# Patient Record
Sex: MALE | Race: WHITE | Employment: OTHER | URBAN - METROPOLITAN AREA
[De-identification: names, ages, dates, MRNs, and addresses within clinical notes are randomized per-mention and may not be internally consistent; named-entity substitution may affect disease eponyms.]

---

## 2018-05-22 ENCOUNTER — TRANSFERRED RECORDS (OUTPATIENT)
Dept: HEALTH INFORMATION MANAGEMENT | Facility: CLINIC | Age: 73
End: 2018-05-22

## 2018-05-23 ENCOUNTER — TRANSFERRED RECORDS (OUTPATIENT)
Dept: HEALTH INFORMATION MANAGEMENT | Facility: CLINIC | Age: 73
End: 2018-05-23

## 2019-01-30 ENCOUNTER — TRANSFERRED RECORDS (OUTPATIENT)
Dept: HEALTH INFORMATION MANAGEMENT | Facility: CLINIC | Age: 74
End: 2019-01-30

## 2019-02-04 ENCOUNTER — APPOINTMENT (OUTPATIENT)
Dept: GENERAL RADIOLOGY | Facility: CLINIC | Age: 74
End: 2019-02-04
Payer: MEDICARE

## 2019-02-04 ENCOUNTER — HOSPITAL ENCOUNTER (EMERGENCY)
Facility: CLINIC | Age: 74
Discharge: HOME | End: 2019-02-04
Attending: NURSE PRACTITIONER | Admitting: NURSE PRACTITIONER
Payer: MEDICARE

## 2019-02-04 ENCOUNTER — MEDICAL CORRESPONDENCE (OUTPATIENT)
Dept: HEALTH INFORMATION MANAGEMENT | Facility: CLINIC | Age: 74
End: 2019-02-04

## 2019-02-04 ENCOUNTER — APPOINTMENT (OUTPATIENT)
Dept: CT IMAGING | Facility: CLINIC | Age: 74
End: 2019-02-04
Payer: MEDICARE

## 2019-02-04 VITALS
RESPIRATION RATE: 16 BRPM | HEIGHT: 71 IN | DIASTOLIC BLOOD PRESSURE: 71 MMHG | OXYGEN SATURATION: 99 % | TEMPERATURE: 98.1 F | WEIGHT: 188 LBS | SYSTOLIC BLOOD PRESSURE: 129 MMHG | HEART RATE: 66 BPM | BODY MASS INDEX: 26.32 KG/M2

## 2019-02-04 DIAGNOSIS — Z51.81 ENCOUNTER FOR THERAPEUTIC DRUG MONITORING: ICD-10-CM

## 2019-02-04 DIAGNOSIS — E78.5 DYSLIPIDEMIA: ICD-10-CM

## 2019-02-04 DIAGNOSIS — E27.40 HYPOADRENALISM (H): ICD-10-CM

## 2019-02-04 DIAGNOSIS — R79.0 LOW FERRITIN: ICD-10-CM

## 2019-02-04 DIAGNOSIS — R79.89 LOW TESTOSTERONE: Primary | ICD-10-CM

## 2019-02-04 DIAGNOSIS — R55 SYNCOPE: ICD-10-CM

## 2019-02-04 LAB
ANION GAP SERPL CALCULATED.3IONS-SCNC: 5 MMOL/L (ref 3–14)
BASOPHILS # BLD AUTO: 0 10E9/L (ref 0–0.2)
BASOPHILS NFR BLD AUTO: 0.4 %
BUN SERPL-MCNC: 17 MG/DL (ref 7–30)
CALCIUM SERPL-MCNC: 9.1 MG/DL (ref 8.5–10.1)
CHLORIDE SERPL-SCNC: 107 MMOL/L (ref 94–109)
CO2 SERPL-SCNC: 28 MMOL/L (ref 20–32)
CREAT SERPL-MCNC: 0.89 MG/DL (ref 0.66–1.25)
D DIMER PPP FEU-MCNC: <0.3 UG/ML FEU (ref 0–0.5)
DIFFERENTIAL METHOD BLD: NORMAL
EOSINOPHIL # BLD AUTO: 0.2 10E9/L (ref 0–0.7)
EOSINOPHIL NFR BLD AUTO: 3.7 %
ERYTHROCYTE [DISTWIDTH] IN BLOOD BY AUTOMATED COUNT: 13.7 % (ref 10–15)
GFR SERPL CREATININE-BSD FRML MDRD: 83 ML/MIN/{1.73_M2}
GLUCOSE SERPL-MCNC: 86 MG/DL (ref 70–99)
HCT VFR BLD AUTO: 42.5 % (ref 40–53)
HGB BLD-MCNC: 14.5 G/DL (ref 13.3–17.7)
IMM GRANULOCYTES # BLD: 0 10E9/L (ref 0–0.4)
IMM GRANULOCYTES NFR BLD: 0.2 %
INTERPRETATION ECG - MUSE: NORMAL
LYMPHOCYTES # BLD AUTO: 1.8 10E9/L (ref 0.8–5.3)
LYMPHOCYTES NFR BLD AUTO: 33.6 %
MCH RBC QN AUTO: 32 PG (ref 26.5–33)
MCHC RBC AUTO-ENTMCNC: 34.1 G/DL (ref 31.5–36.5)
MCV RBC AUTO: 94 FL (ref 78–100)
MONOCYTES # BLD AUTO: 0.3 10E9/L (ref 0–1.3)
MONOCYTES NFR BLD AUTO: 5.3 %
NEUTROPHILS # BLD AUTO: 3.1 10E9/L (ref 1.6–8.3)
NEUTROPHILS NFR BLD AUTO: 56.8 %
NRBC # BLD AUTO: 0 10*3/UL
NRBC BLD AUTO-RTO: 0 /100
PLATELET # BLD AUTO: 210 10E9/L (ref 150–450)
POTASSIUM SERPL-SCNC: 4.1 MMOL/L (ref 3.4–5.3)
RBC # BLD AUTO: 4.53 10E12/L (ref 4.4–5.9)
SODIUM SERPL-SCNC: 140 MMOL/L (ref 133–144)
TROPONIN I SERPL-MCNC: <0.015 UG/L (ref 0–0.04)
WBC # BLD AUTO: 5.4 10E9/L (ref 4–11)

## 2019-02-04 PROCEDURE — 85025 COMPLETE CBC W/AUTO DIFF WBC: CPT | Performed by: NURSE PRACTITIONER

## 2019-02-04 PROCEDURE — 99285 EMERGENCY DEPT VISIT HI MDM: CPT | Mod: 25

## 2019-02-04 PROCEDURE — 71046 X-RAY EXAM CHEST 2 VIEWS: CPT

## 2019-02-04 PROCEDURE — 80048 BASIC METABOLIC PNL TOTAL CA: CPT | Performed by: NURSE PRACTITIONER

## 2019-02-04 PROCEDURE — 85379 FIBRIN DEGRADATION QUANT: CPT | Performed by: NURSE PRACTITIONER

## 2019-02-04 PROCEDURE — 84484 ASSAY OF TROPONIN QUANT: CPT | Performed by: NURSE PRACTITIONER

## 2019-02-04 PROCEDURE — 70450 CT HEAD/BRAIN W/O DYE: CPT

## 2019-02-04 RX ORDER — TESTOSTERONE ENANTHATE 200 MG/ML
INJECTION, SOLUTION INTRAMUSCULAR
COMMUNITY
End: 2020-12-09

## 2019-02-04 SDOH — HEALTH STABILITY: MENTAL HEALTH: HOW OFTEN DO YOU HAVE A DRINK CONTAINING ALCOHOL?: NEVER

## 2019-02-04 ASSESSMENT — ENCOUNTER SYMPTOMS
CONSTIPATION: 0
WEAKNESS: 0
FATIGUE: 1
DIARRHEA: 0
DYSURIA: 0
ABDOMINAL PAIN: 0

## 2019-02-04 ASSESSMENT — MIFFLIN-ST. JEOR: SCORE: 1609.89

## 2019-02-04 NOTE — ED AVS SNAPSHOT
Emergency Department  [address] [place] [address]  Phone:  [phone]  Fax:  [phone]                                    Supa Garcia   MRN: [id number]    Department:   Emergency Department   Date of Visit:  2/4/2019           After Visit Summary Signature Page    I have received my discharge instructions, and my questions have been answered. I have discussed any challenges I see with this plan with the nurse or doctor.    ..........................................................................................................................................  Patient/Patient Representative Signature      ..........................................................................................................................................  Patient Representative Print Name and Relationship to Patient    ..................................................               ................................................  Date                                   Time    ..........................................................................................................................................  Reviewed by Signature/Title    ...................................................              ..............................................  Date                                               Time          22EPIC Rev 08/18

## 2019-02-04 NOTE — ED PROVIDER NOTES
"  History     Chief Complaint:  Syncope    HPI   Supa Garcia is a 73 year old male who presents alone to the Emergency Department for evaluation of syncope. The patient reports that on 01/30/2019 he was seen in the E.D in [place] after collapsing and having 10 minute syncopal episode immediately after ejaculation during coitus. He notes that prior to this episode, he was asymptomatic.  His HR was low and his wife performed CPR for 30 seconds before he woke and called 911.Since this episode, he has not had any syncope episode, chest pain, shortness or breath, dizziness, headache. However, he wants to rule out any underlying abnormalities causing his syncopal episode. He has had prior cardiac workup 4 years ago, given his family history. He denies any dysuria, diarrhea, abdominal pain, or extremity weakness or numbness.      Of note, the patient has been on testosterone enanthate injections for the past 3 months. His injection are once weekly. And he notes improvement in his libido.       Allergies:  No Known Drug Allergies     Medications:    testosterone enanthate     Past Medical History:    Allergic state  Decreased Libido   Hearing loss     Past Surgical History:    HERNIA REPAIR    Family History:   Cardiac-brother passed away at 61 from MI     Social History:  Marital Status:     The patient presents alone  Smoking Status: Never  Smokeless Tobacco: Never  Alcohol Use: Yes-Social      Review of Systems   Constitutional: Positive for fatigue.   Eyes: Positive for visual disturbance.   Gastrointestinal: Negative for abdominal pain, constipation and diarrhea.   Genitourinary: Negative for dysuria.   Neurological: Positive for syncope. Negative for weakness.   All other systems reviewed and are negative.    Physical Exam   First Vitals:  BP: 125/74  Pulse: 63  Temp: 98  F (36.7  C)  Resp: 18  Height: 180.3 cm (5' 11\")  SpO2: 99 %    Physical Exam  Nursing notes reviewed. Vitals " reviewed.  General: Alert. Well kept.  Eyes:  Conjunctiva non-injected, non-icteric. EOMI  Neck/Throat: Moist mucous membranes. Normal voice.  Cardiac: Regular rhythm. Normal heart sounds.  Pulmonary: Clear and equal breath sounds bilaterally.   Abdomen: soft and non-tender.  Musculoskeletal: Normal gross range of motion of all 4 extremities.   Skin: Warm and dry. Normal appearance of visualized exposed skin without rashes or petechiae.  Psych: Affect normal. Good eye contact.  Neurological:  Mental: Alert and oriented x4. follows commands, no aphasia or dysarthria.   Cranial Nerves:  CN II: visual acuity - able to accurately count fingers with each eye. Visual fields intact.  CN III, IV, VI: EOM intact, pupils equal and reactive  CN V: facial sensation nl  CN VII: face symmetric, no facial droop  CN VIII: hearing normal  CN IX: palate elevation symmetric, uvula at midline  CN XI SCM normal, shoulder shrug nl  CN XII: tongue midline  Motor: Strength: 5/5 in all major groups of all extremities. Normal tone. No abnormal movements. No pronator drift b/l.  Reflexes:  No clonus noted.    Sensory: temperature, light touch intact.   Coordination: FNF and heel-shin tests intact b/l.   Gait:  Normal.    Emergency Department Course     Imaging:  Radiology findings were communicated with the patient who voiced understanding of the findings.    XR Chest 2 Views  No radiographic evidence of acute chest abnormality.   Report per radiology      Head CT w/o contrast  No evidence of acute intracranial hemorrhage, mass, or  Herniation.  Report per radiology      Laboratory:  Laboratory findings were communicated with the patient who voiced understanding of the findings.    Troponin: < 0.015     D Dimer: < 0.3    BMP: AWNL (Creatinine 0.89)     CBC: AWNL. (WBC 5.4, HGB 14.5, )      Emergency Department Course:  Nursing notes and vitals reviewed.  1600: I performed an exam of the patient as documented above.     Findings and  plan explained to the Patient. Patient discharged home with instructions regarding supportive care, medications, and reasons to return. The importance of close follow-up was reviewed.     Impression & Plan      Medical Decision Making:  Supa Garcia is a 73 year old male who presents for recheck on syncope. Patient was initially seen 5 days ago after sustaining an episode of syncope which required less than 1 min CPR per his wife's report. He was evaluated in the ED at [place], [place], and thought likely to have vaso-vagal syncope following ejaculation during coitus. The patient states that since that time he has had no symptoms including no chest pain, shortness of breath, headache, dizziness, weakness/numbness, or fevers. He has had no repeat syncopal event and he has been able to maintain his normal activities. He was instructed to return for follow with his primary care provider, but patient elected to follow up in the ED. Initial work up included a CBC, CMP, troponin which all returned without abnormality. Patient and work up today is without abnormality, and troponin returns negative. PE is considered  Unlikely and d dimer returns negative. CBC and BMP are without abnormality, is tolerating fluids, and has no signs of infectious process. Chest x-ray shows no signs of fluid overload, pneumonia, pneumothorax. Secondary to concern for possible stroke as cause of his symptoms, head CT was obtained and shows no evidence of acute intracranial hemorrhage, mass, or herniation. His neurologic exam is normal and stroke is considered unlikely. I discussed with the patient that stress echo would be indicated and patient requested it be ordered from the ED which was completed today. He does not have a cardiologist as he has had no cardiac evaluation in the past. He will follow up with primary care within 72 hours and was given a referral. No indication for further work up or admission. Patient was discharged in  good condition.     Diagnosis:    ICD-10-CM    1. Syncope R55 Exercise Stress Echocardiogram     Disposition:  discharged to home  Kimberly Rosenberg  2/4/2019    EMERGENCY DEPARTMENT    Scribe Disclosure:  I, Kimberly Rosenberg, am serving as a scribe at 4:00 PM on 2/4/2019 to document services personally performed by Bridget Mitchell CNP based on my observations and the provider's statements to me.        Bridget Mitchell CNP  02/04/19 2011

## 2019-02-05 ENCOUNTER — HOSPITAL ENCOUNTER (OUTPATIENT)
Dept: CARDIOLOGY | Facility: CLINIC | Age: 74
Discharge: HOME | End: 2019-02-05
Attending: NURSE PRACTITIONER | Admitting: NURSE PRACTITIONER
Payer: MEDICARE

## 2019-02-05 ENCOUNTER — HOSPITAL ENCOUNTER (OUTPATIENT)
Dept: CARDIOLOGY | Facility: CLINIC | Age: 74
End: 2019-02-05
Payer: MEDICARE

## 2019-02-05 ENCOUNTER — HOSPITAL ENCOUNTER (OUTPATIENT)
Dept: LAB | Facility: CLINIC | Age: 74
End: 2019-02-05
Attending: NURSE PRACTITIONER
Payer: MEDICARE

## 2019-02-05 DIAGNOSIS — R79.89 LOW TESTOSTERONE: ICD-10-CM

## 2019-02-05 DIAGNOSIS — E27.40 HYPOADRENALISM (H): ICD-10-CM

## 2019-02-05 DIAGNOSIS — Z82.49 FAMILY HISTORY OF CORONARY ARTERY DISEASE: ICD-10-CM

## 2019-02-05 DIAGNOSIS — R55 SYNCOPE: ICD-10-CM

## 2019-02-05 DIAGNOSIS — Z51.81 ENCOUNTER FOR THERAPEUTIC DRUG MONITORING: ICD-10-CM

## 2019-02-05 DIAGNOSIS — E78.5 DYSLIPIDEMIA: ICD-10-CM

## 2019-02-05 DIAGNOSIS — R79.0 LOW FERRITIN: ICD-10-CM

## 2019-02-05 LAB
CHOLEST SERPL-MCNC: 230 MG/DL
CORTIS SERPL-MCNC: 8.8 UG/DL (ref 4–22)
ERYTHROCYTE [DISTWIDTH] IN BLOOD BY AUTOMATED COUNT: 13.7 % (ref 10–15)
FERRITIN SERPL-MCNC: 50 NG/ML (ref 26–388)
HCT VFR BLD AUTO: 41.7 % (ref 40–53)
HDLC SERPL-MCNC: 57 MG/DL
HGB BLD-MCNC: 14.5 G/DL (ref 13.3–17.7)
LDLC SERPL CALC-MCNC: 145 MG/DL
MCH RBC QN AUTO: 32.4 PG (ref 26.5–33)
MCHC RBC AUTO-ENTMCNC: 34.8 G/DL (ref 31.5–36.5)
MCV RBC AUTO: 93 FL (ref 78–100)
NONHDLC SERPL-MCNC: 173 MG/DL
PLATELET # BLD AUTO: 184 10E9/L (ref 150–450)
RBC # BLD AUTO: 4.47 10E12/L (ref 4.4–5.9)
TRIGL SERPL-MCNC: 140 MG/DL
WBC # BLD AUTO: 5.7 10E9/L (ref 4–11)

## 2019-02-05 PROCEDURE — 82533 TOTAL CORTISOL: CPT | Performed by: FAMILY MEDICINE

## 2019-02-05 PROCEDURE — 80061 LIPID PANEL: CPT | Performed by: FAMILY MEDICINE

## 2019-02-05 PROCEDURE — 40000264 ECHO STRESS ECHOCARDIOGRAM

## 2019-02-05 PROCEDURE — 84270 ASSAY OF SEX HORMONE GLOBUL: CPT | Performed by: FAMILY MEDICINE

## 2019-02-05 PROCEDURE — 85027 COMPLETE CBC AUTOMATED: CPT | Performed by: FAMILY MEDICINE

## 2019-02-05 PROCEDURE — 93321 DOPPLER ECHO F-UP/LMTD STD: CPT | Mod: 26 | Performed by: INTERNAL MEDICINE

## 2019-02-05 PROCEDURE — 82728 ASSAY OF FERRITIN: CPT | Performed by: FAMILY MEDICINE

## 2019-02-05 PROCEDURE — 75571 CT HRT W/O DYE W/CA TEST: CPT

## 2019-02-05 PROCEDURE — 82627 DEHYDROEPIANDROSTERONE: CPT | Performed by: FAMILY MEDICINE

## 2019-02-05 PROCEDURE — 25500064 ZZH RX 255 OP 636: Performed by: NURSE PRACTITIONER

## 2019-02-05 PROCEDURE — 36415 COLL VENOUS BLD VENIPUNCTURE: CPT | Performed by: FAMILY MEDICINE

## 2019-02-05 PROCEDURE — 93016 CV STRESS TEST SUPVJ ONLY: CPT | Performed by: INTERNAL MEDICINE

## 2019-02-05 PROCEDURE — 82670 ASSAY OF TOTAL ESTRADIOL: CPT | Performed by: FAMILY MEDICINE

## 2019-02-05 PROCEDURE — 84403 ASSAY OF TOTAL TESTOSTERONE: CPT | Performed by: FAMILY MEDICINE

## 2019-02-05 PROCEDURE — 93350 STRESS TTE ONLY: CPT | Mod: 26 | Performed by: INTERNAL MEDICINE

## 2019-02-05 PROCEDURE — 93325 DOPPLER ECHO COLOR FLOW MAPG: CPT | Mod: 26 | Performed by: INTERNAL MEDICINE

## 2019-02-05 PROCEDURE — 93018 CV STRESS TEST I&R ONLY: CPT | Performed by: INTERNAL MEDICINE

## 2019-02-05 PROCEDURE — 75571 CT HRT W/O DYE W/CA TEST: CPT | Mod: 26 | Performed by: INTERNAL MEDICINE

## 2019-02-05 RX ADMIN — HUMAN ALBUMIN MICROSPHERES AND PERFLUTREN 3 ML: 10; .22 INJECTION, SOLUTION INTRAVENOUS at 13:45

## 2019-02-06 LAB — DHEA-S SERPL-MCNC: 93 UG/DL (ref 80–560)

## 2019-02-07 LAB
RADIOLOGIST FLAGS: ABNORMAL
SHBG SERPL-SCNC: 39 NMOL/L (ref 11–80)
TESTOST FREE SERPL-MCNC: 3.87 NG/DL (ref 4.7–24.4)
TESTOST SERPL-MCNC: 224 NG/DL (ref 240–950)

## 2019-02-07 NOTE — PROGRESS NOTES
RN attempted to call patient twice, no answer, unable to leave message. Copy of calcium screen results will be mailed to pt and PCP.

## 2019-02-08 NOTE — PROGRESS NOTES
Calcium score results sent to patient and routed to PCP list in Epic, Rakesh Copeland          .Amanda Arciniega, CV Imaging Manager

## 2019-02-12 LAB — ESTRADIOL SERPL HS-MCNC: 13 PG/ML (ref 10–40)

## 2019-02-13 ENCOUNTER — CARE COORDINATION (OUTPATIENT)
Dept: CARDIOLOGY | Facility: CLINIC | Age: 74
End: 2019-02-13

## 2019-02-14 ENCOUNTER — TRANSFERRED RECORDS (OUTPATIENT)
Dept: HEALTH INFORMATION MANAGEMENT | Facility: CLINIC | Age: 74
End: 2019-02-14

## 2019-02-14 PROBLEM — R55 SYNCOPE: Status: ACTIVE | Noted: 2019-02-14

## 2019-02-21 ENCOUNTER — OFFICE VISIT (OUTPATIENT)
Dept: CARDIOLOGY | Facility: CLINIC | Age: 74
End: 2019-02-21
Payer: MEDICARE

## 2019-02-21 VITALS
WEIGHT: 195 LBS | SYSTOLIC BLOOD PRESSURE: 130 MMHG | HEIGHT: 71 IN | BODY MASS INDEX: 27.3 KG/M2 | HEART RATE: 72 BPM | DIASTOLIC BLOOD PRESSURE: 76 MMHG

## 2019-02-21 DIAGNOSIS — E78.5 HYPERLIPIDEMIA WITH TARGET LDL LESS THAN 130: ICD-10-CM

## 2019-02-21 DIAGNOSIS — R55 VASOVAGAL SYNCOPE: ICD-10-CM

## 2019-02-21 DIAGNOSIS — Z82.49 FAMILY HISTORY OF CORONARY ARTERY DISEASE: Primary | ICD-10-CM

## 2019-02-21 PROCEDURE — 99204 OFFICE O/P NEW MOD 45 MIN: CPT | Performed by: INTERNAL MEDICINE

## 2019-02-21 RX ORDER — CHLORAL HYDRATE 500 MG
1 CAPSULE ORAL DAILY
COMMUNITY

## 2019-02-21 RX ORDER — ATORVASTATIN CALCIUM 20 MG/1
20 TABLET, FILM COATED ORAL DAILY
Qty: 90 TABLET | Refills: 3 | Status: SHIPPED | OUTPATIENT
Start: 2019-02-21 | End: 2019-05-22

## 2019-02-21 RX ORDER — VITS A,C,E/LUTEIN/MINERALS 300MCG-200
1 TABLET ORAL DAILY
COMMUNITY

## 2019-02-21 ASSESSMENT — MIFFLIN-ST. JEOR: SCORE: 1633.7

## 2019-02-21 NOTE — PROGRESS NOTES
HPI and Plan:   See dictation    No orders of the defined types were placed in this encounter.    Orders Placed This Encounter   Medications     multivitamin (OCUVITE) TABS tablet     Sig: Take 1 tablet by mouth daily     fish oil-omega-3 fatty acids 1000 MG capsule     Sig: Take 1 g by mouth daily     There are no discontinued medications.      No diagnosis found.    CURRENT MEDICATIONS:  Current Outpatient Medications   Medication Sig Dispense Refill     fish oil-omega-3 fatty acids 1000 MG capsule Take 1 g by mouth daily       multivitamin (OCUVITE) TABS tablet Take 1 tablet by mouth daily       testosterone enanthate (DELATESTRYL) 200 MG/ML injection Inject into the muscle every 14 days         ALLERGIES   No Known Allergies    PAST MEDICAL HISTORY:  Past Medical History:   Diagnosis Date     Allergic state      Syncope        PAST SURGICAL HISTORY:  Past Surgical History:   Procedure Laterality Date     HERNIA REPAIR         FAMILY HISTORY:  Family History   Problem Relation Age of Onset     Hyperlipidemia Father      Coronary Artery Disease Father      Coronary Artery Disease Brother        SOCIAL HISTORY:  Social History     Socioeconomic History     Marital status:      Spouse name: None     Number of children: None     Years of education: None     Highest education level: None   Occupational History     None   Social Needs     Financial resource strain: None     Food insecurity:     Worry: None     Inability: None     Transportation needs:     Medical: None     Non-medical: None   Tobacco Use     Smoking status: Never Smoker     Smokeless tobacco: Never Used   Substance and Sexual Activity     Alcohol use: Yes     Frequency: Never     Comment: social     Drug use: No     Sexual activity: None   Lifestyle     Physical activity:     Days per week: None     Minutes per session: None     Stress: None   Relationships     Social connections:     Talks on phone: None     Gets together: None     Attends  "Anabaptist service: None     Active member of club or organization: None     Attends meetings of clubs or organizations: None     Relationship status: None     Intimate partner violence:     Fear of current or ex partner: None     Emotionally abused: None     Physically abused: None     Forced sexual activity: None   Other Topics Concern     Parent/sibling w/ CABG, MI or angioplasty before 65F 55M? No   Social History Narrative     None       Review of Systems:  Skin:  Negative     Eyes:  Negative    ENT:  Negative    Respiratory:  Negative    Cardiovascular:    Positive for;syncope or near-syncope(one episode during intercourse.  10 min. )  Gastroenterology: Negative    Genitourinary:  not assessed    Musculoskeletal:  Negative    Neurologic:  Negative    Psychiatric:  Negative    Heme/Lymph/Imm:  Negative    Endocrine:  Negative            Physical Exam:  Vitals: /76   Pulse 72   Ht 1.791 m (5' 10.5\")   Wt 88.5 kg (195 lb)   BMI 27.58 kg/m    Constitutional: cooperative, alert and oriented, well developed, well nourished, in no acute distress   Skin: warm and dry to the touch, no apparent skin lesions or masses noted   Head: normocephalic, no masses or lesions   Eyes: pupils equal and round, conjunctivae and lids unremarkable, sclera white, no xanthalasma, EOMS intact, no nystagmus   ENT: no pallor or cyanosis, dentition good   Neck: carotid pulses are full and equal bilaterally, JVP normal, no carotid bruit, no thyromegaly   Chest: normal breath sounds, clear to auscultation, normal A-P diameter, normal symmetry, normal respiratory excursion, no use of accessory muscles   Cardiac: regular rhythm, normal S1/S2, no S3 or S4, apical impulse not displaced, no murmurs, gallops or rubs no presence of murmur   Abdomen: abdomen soft, non-tender, BS normoactive, no mass, no HSM, no bruits   Vascular: pulses full and equal, no bruits auscultated   Extremities and Back: no deformities, clubbing, cyanosis, erythema " observed   Neurological: affect appropriate, oriented to time, person and place     Recent Lab Results:  LIPID RESULTS:  Lab Results   Component Value Date    CHOL 230 (H) 02/05/2019    HDL 57 02/05/2019     (H) 02/05/2019    TRIG 140 02/05/2019    CHOLHDLRATIO 3.7 09/10/2010       LIVER ENZYME RESULTS:  Lab Results   Component Value Date    AST 13 10/11/2014    ALT 21 10/11/2014       CBC RESULTS:  Lab Results   Component Value Date    WBC 5.7 02/05/2019    RBC 4.47 02/05/2019    HGB 14.5 02/05/2019    HCT 41.7 02/05/2019    MCV 93 02/05/2019    MCH 32.4 02/05/2019    MCHC 34.8 02/05/2019    RDW 13.7 02/05/2019     02/05/2019       BMP RESULTS:  Lab Results   Component Value Date     02/04/2019    POTASSIUM 4.1 02/04/2019    CHLORIDE 107 02/04/2019    CO2 28 02/04/2019    ANIONGAP 5 02/04/2019    GLC 86 02/04/2019    BUN 17 02/04/2019    CR 0.89 02/04/2019    GFRESTIMATED 83 02/04/2019    GFRESTBLACK >90 02/04/2019    MAXINE 9.1 02/04/2019        A1C RESULTS:  Lab Results   Component Value Date    A1C 5.6 03/12/2010       INR RESULTS:  No results found for: INR        CC  Rakesh GOMES MD  [address]  [place] [address]

## 2019-02-21 NOTE — PROGRESS NOTES
Service Date: 02/21/2019      HISTORY OF PRESENT ILLNESS:  Mr. Garcia is a 73-year-old gentleman who is a new patient here referred for a previous history of syncope.  Apparently, back earlier on 02/04/2019, he was in [place] and was making love to his wife and had a complete syncopal episode.  She states that it was for approximately 10 minutes, and she did CPR.  He subsequently went to the emergency room for evaluation, including a head CT, which was negative.  EKG, troponins and BMP were all negative.  The etiology of the syncope was unclear.  He underwent a stress echocardiogram on 02/05/2019 showing no evidence of ischemia and a normal ejection fraction.  He underwent a CT calcium score showing a calcium score of 241.9, putting him in the 50th percentile for his age-matched gender.      He is here for followup.  He has had no recurrent episodes.  He denies any chest pain, chest pressure, shortness of breath, recurrent syncope, PND, orthopnea or pedal edema.  He does not smoke.  He does have a family history of coronary artery disease in his brothers as well as his father at age 61.  He has hyperlipidemia, total cholesterol 230, LDL cholesterol 145.  He is fairly active, trying to walk 10,000 steps per day, but has no regular physical exercise.  He has previously been on statins, but did not tolerate them well.      ASSESSMENT AND PLAN:  Overall, Mr. Garcia has 2 concerns.  One is his overall cardiovascular risk.  He has an approximately 25% 10 year risk for a cardiovascular event.  Therefore, this warrants moderate- to high-intensity statin therapy.  I have started Lipitor 20 mg daily.  We will have him follow up with Shr[facility] in 2-3 months with a repeat lipid profile.      Regarding a syncopal episode, I am concerned with him regarding the fact that he does have a bifascicular block.  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  Because of this, I would recommend a Holter monitor to see if he is having some evidence of third-degree heart block or second-degree heart block, which may not be particularly symptomatic at this point in time.  No further evaluation of his coronaries is necessary at this point in time.         STEPHANIE AGUIRRE MD [facility]             D: 2019   T: 2019   MT: paz      Name:     CLAIRE HOBBS   MRN:      [id number]        Account:      [id number]   :      1945           Service Date: 2019      Document: [id number]

## 2019-02-21 NOTE — LETTER
2/21/2019    Bill Mcfadden MD  [facility] [address] [place] [id number]    RE: Supa Garcia       Dear Colleague,    I had the pleasure of seeing Supa Garcia in the [facility].    HPI and Plan:   See dictation    No orders of the defined types were placed in this encounter.    Orders Placed This Encounter   Medications     multivitamin (OCUVITE) TABS tablet     Sig: Take 1 tablet by mouth daily     fish oil-omega-3 fatty acids 1000 MG capsule     Sig: Take 1 g by mouth daily     There are no discontinued medications.      No diagnosis found.    CURRENT MEDICATIONS:  Current Outpatient Medications   Medication Sig Dispense Refill     fish oil-omega-3 fatty acids 1000 MG capsule Take 1 g by mouth daily       multivitamin (OCUVITE) TABS tablet Take 1 tablet by mouth daily       testosterone enanthate (DELATESTRYL) 200 MG/ML injection Inject into the muscle every 14 days         ALLERGIES   No Known Allergies    PAST MEDICAL HISTORY:  Past Medical History:   Diagnosis Date     Allergic state      Syncope        PAST SURGICAL HISTORY:  Past Surgical History:   Procedure Laterality Date     HERNIA REPAIR         FAMILY HISTORY:  Family History   Problem Relation Age of Onset     Hyperlipidemia Father      Coronary Artery Disease Father      Coronary Artery Disease Brother        SOCIAL HISTORY:  Social History     Socioeconomic History     Marital status:      Spouse name: None     Number of children: None     Years of education: None     Highest education level: None   Occupational History     None   Social Needs     Financial resource strain: None     Food insecurity:     Worry: None     Inability: None     Transportation needs:     Medical: None     Non-medical: None   Tobacco Use     Smoking status: Never Smoker     Smokeless tobacco: Never Used   Substance and Sexual Activity     Alcohol use: Yes     Frequency: Never     Comment: social     Drug  "use: No     Sexual activity: None   Lifestyle     Physical activity:     Days per week: None     Minutes per session: None     Stress: None   Relationships     Social connections:     Talks on phone: None     Gets together: None     Attends Protestant service: None     Active member of club or organization: None     Attends meetings of clubs or organizations: None     Relationship status: None     Intimate partner violence:     Fear of current or ex partner: None     Emotionally abused: None     Physically abused: None     Forced sexual activity: None   Other Topics Concern     Parent/sibling w/ CABG, MI or angioplasty before 65F 55M? No   Social History Narrative     None       Review of Systems:  Skin:  Negative     Eyes:  Negative    ENT:  Negative    Respiratory:  Negative    Cardiovascular:    Positive for;syncope or near-syncope(one episode during intercourse.  10 min. )  Gastroenterology: Negative    Genitourinary:  not assessed    Musculoskeletal:  Negative    Neurologic:  Negative    Psychiatric:  Negative    Heme/Lymph/Imm:  Negative    Endocrine:  Negative            Physical Exam:  Vitals: /76   Pulse 72   Ht 1.791 m (5' 10.5\")   Wt 88.5 kg (195 lb)   BMI 27.58 kg/m     Constitutional: cooperative, alert and oriented, well developed, well nourished, in no acute distress   Skin: warm and dry to the touch, no apparent skin lesions or masses noted   Head: normocephalic, no masses or lesions   Eyes: pupils equal and round, conjunctivae and lids unremarkable, sclera white, no xanthalasma, EOMS intact, no nystagmus   ENT: no pallor or cyanosis, dentition good   Neck: carotid pulses are full and equal bilaterally, JVP normal, no carotid bruit, no thyromegaly   Chest: normal breath sounds, clear to auscultation, normal A-P diameter, normal symmetry, normal respiratory excursion, no use of accessory muscles   Cardiac: regular rhythm, normal S1/S2, no S3 or S4, apical impulse not displaced, no murmurs, " gallops or rubs no presence of murmur   Abdomen: abdomen soft, non-tender, BS normoactive, no mass, no HSM, no bruits   Vascular: pulses full and equal, no bruits auscultated   Extremities and Back: no deformities, clubbing, cyanosis, erythema observed   Neurological: affect appropriate, oriented to time, person and place     Recent Lab Results:  LIPID RESULTS:  Lab Results   Component Value Date    CHOL 230 (H) 02/05/2019    HDL 57 02/05/2019     (H) 02/05/2019    TRIG 140 02/05/2019    CHOLHDLRATIO 3.7 09/10/2010       LIVER ENZYME RESULTS:  Lab Results   Component Value Date    AST 13 10/11/2014    ALT 21 10/11/2014       CBC RESULTS:  Lab Results   Component Value Date    WBC 5.7 02/05/2019    RBC 4.47 02/05/2019    HGB 14.5 02/05/2019    HCT 41.7 02/05/2019    MCV 93 02/05/2019    MCH 32.4 02/05/2019    MCHC 34.8 02/05/2019    RDW 13.7 02/05/2019     02/05/2019       BMP RESULTS:  Lab Results   Component Value Date     02/04/2019    POTASSIUM 4.1 02/04/2019    CHLORIDE 107 02/04/2019    CO2 28 02/04/2019    ANIONGAP 5 02/04/2019    GLC 86 02/04/2019    BUN 17 02/04/2019    CR 0.89 02/04/2019    GFRESTIMATED 83 02/04/2019    GFRESTBLACK >90 02/04/2019    MAXINE 9.1 02/04/2019        A1C RESULTS:  Lab Results   Component Value Date    A1C 5.6 03/12/2010       INR RESULTS:  No results found for: INR        CC  Rakesh GOMES MD  [address]  [place] [address]                    Thank you for allowing me to participate in the care of your patient.      Sincerely,     STEPHANIE AGUIRRE MD     [facility]    cc:   Rakesh GOMES MD  [address]  [place] [address]         Patient

## 2019-02-21 NOTE — LETTER
2/21/2019      Bill Mcfadden MD  [address] [address]      RE: Supa Garcia       Dear Colleague,    I had the pleasure of seeing Supa Garcia in the [facility].    Service Date: 02/21/2019      HISTORY OF PRESENT ILLNESS:  Mr. Garcia is a 73-year-old gentleman who is a new patient here referred for a previous history of syncope.  Apparently, back earlier on 02/04/2019, he was in [place] and was making love to his wife and had a complete syncopal episode.  She states that it was for approximately 10 minutes, and she did CPR.  He subsequently went to the emergency room for evaluation, including a head CT, which was negative.  EKG, troponins and BMP were all negative.  The etiology of the syncope was unclear.  He underwent a stress echocardiogram on 02/05/2019 showing no evidence of ischemia and a normal ejection fraction.  He underwent a CT calcium score showing a calcium score of 241.9, putting him in the 50th percentile for his age-matched gender.      He is here for followup.  He has had no recurrent episodes.  He denies any chest pain, chest pressure, shortness of breath, recurrent syncope, PND, orthopnea or pedal edema.  He does not smoke.  He does have a family history of coronary artery disease in his brothers as well as his father at age 61.  He has hyperlipidemia, total cholesterol 230, LDL cholesterol 145.  He is fairly active, trying to walk 10,000 steps per day, but has no regular physical exercise.  He has previously been on statins, but did not tolerate them well.      ASSESSMENT AND PLAN:  Overall, Mr. Garcia has 2 concerns.  One is his overall cardiovascular risk.  He has an approximately 25% 10 year risk for a cardiovascular event.  Therefore, this warrants moderate- to high-intensity statin therapy.  I have started Lipitor 20 mg daily.  We will have him follow up with Shr[facility] in 2-3 months with a repeat lipid  profile.      Regarding a syncopal episode, I am concerned with him regarding the fact that he does have a bifascicular block.  EKG shows a right bundle branch block and a left anterior fascicular block, putting him at increased risk for a potential heart block and pacemaker needs.  Because of this, I would recommend a Holter monitor to see if he is having some evidence of third-degree heart block or second-degree heart block, which may not be particularly symptomatic at this point in time.  No further evaluation of his coronaries is necessary at this point in time.         STEPHANIE AGUIRRE MD [facility]             D: 2019   T: 2019   MT: paz      Name:     CLAIRE HOBBS   MRN:      [id number]        Account:      [id number]   :      1945           Service Date: 2019      Document: [id number]         Outpatient Encounter Medications as of 2019   Medication Sig Dispense Refill     atorvastatin (LIPITOR) 20 MG tablet Take 1 tablet (20 mg) by mouth daily 90 tablet 3     fish oil-omega-3 fatty acids 1000 MG capsule Take 1 g by mouth daily       multivitamin (OCUVITE) TABS tablet Take 1 tablet by mouth daily       testosterone enanthate (DELATESTRYL) 200 MG/ML injection Inject into the muscle every 14 days       No facility-administered encounter medications on file as of 2019.        Again, thank you for allowing me to participate in the care of your patient.      Sincerely,    STEPHANIE AGUIRRE MD     [facility] [place]    cc:   Rakesh GOMES MD  [address] [facility] [address]  [place] [address]

## 2019-02-28 ENCOUNTER — HOSPITAL ENCOUNTER (OUTPATIENT)
Dept: CARDIOLOGY | Facility: CLINIC | Age: 74
Discharge: HOME | End: 2019-02-28
Attending: INTERNAL MEDICINE | Admitting: INTERNAL MEDICINE
Payer: MEDICARE

## 2019-02-28 DIAGNOSIS — E78.5 HYPERLIPIDEMIA WITH TARGET LDL LESS THAN 130: ICD-10-CM

## 2019-02-28 DIAGNOSIS — Z82.49 FAMILY HISTORY OF CORONARY ARTERY DISEASE: ICD-10-CM

## 2019-02-28 DIAGNOSIS — R55 VASOVAGAL SYNCOPE: ICD-10-CM

## 2019-02-28 PROCEDURE — 93227 XTRNL ECG REC<48 HR R&I: CPT | Performed by: INTERNAL MEDICINE

## 2019-02-28 PROCEDURE — 93226 XTRNL ECG REC<48 HR SCAN A/R: CPT

## 2019-03-07 ENCOUNTER — CARE COORDINATION (OUTPATIENT)
Dept: CARDIOLOGY | Facility: CLINIC | Age: 74
End: 2019-03-07

## 2019-03-07 NOTE — PROGRESS NOTES
Dr. Downs has reviewed holter monitor.  Rhythm is sinus wiht RBBB.    Average HR = 62 bpm.  Min. HR =  46 bpm.  Max HR = 151 bpm.   Has 279 SVT ectopics, 14 couplets.  No pauses > 2.0 seconds.  Patient has been informed that these rhythms would not have caused his syncopal episode.  He should continue to monitor and call if any problems.  No changes at this time per Dr. Downs.

## 2019-05-22 ENCOUNTER — OFFICE VISIT (OUTPATIENT)
Dept: CARDIOLOGY | Facility: CLINIC | Age: 74
End: 2019-05-22
Attending: INTERNAL MEDICINE
Payer: MEDICARE

## 2019-05-22 VITALS
DIASTOLIC BLOOD PRESSURE: 75 MMHG | SYSTOLIC BLOOD PRESSURE: 115 MMHG | WEIGHT: 186.2 LBS | HEART RATE: 66 BPM | BODY MASS INDEX: 26.07 KG/M2 | HEIGHT: 71 IN

## 2019-05-22 DIAGNOSIS — E78.5 HYPERLIPIDEMIA WITH TARGET LDL LESS THAN 130: ICD-10-CM

## 2019-05-22 DIAGNOSIS — R55 VASOVAGAL SYNCOPE: Primary | ICD-10-CM

## 2019-05-22 PROCEDURE — 99213 OFFICE O/P EST LOW 20 MIN: CPT | Performed by: NURSE PRACTITIONER

## 2019-05-22 RX ORDER — AMPICILLIN TRIHYDRATE 250 MG
CAPSULE ORAL DAILY
COMMUNITY
End: 2023-02-15

## 2019-05-22 ASSESSMENT — MIFFLIN-ST. JEOR: SCORE: 1588.79

## 2019-05-22 NOTE — PROGRESS NOTES
Cardiology Clinic Progress Note  Supa Garcia MRN# [id number]   YOB: 1945 Age: 74 year old     Reason For Visit:  3-month follow up   Primary Cardiologist:   Dr. Downs           History of Presenting Illness:    Supa Garcia is a pleasant 74 year old patient who carries a past medical history significant for family history of premature coronary artery disease, hyperlipidemia, and recent episode of syncope.    He was last seen on 2/21/2019  and recommended he wear a holter monitor to rule out arrhythmia. Monitor demonstrated the principle rhythm was sinus with RBBB and variable QRS morphology throughout. The average heart rate was 62 bpm. There were no pauses greater than 2.0 seconds. There were 279 isolated supraventricular ectopics, 14 couplets, and 12 runs totaling 53 beats. The longest run was 9 beats. His recent cardiac work-up consisted of a CT coronary calcium score of 241.99, placing him in the 50th percentile when compared to age and gender matched control group.  Last lipid panel showed a total cholesterol of 230, HDL 57, , triglycerides 140.  He was recommended moderate to high intensity statin but elected to not start it. He states his daughter and son-in-law are chiropractors and advised against statins. He has opted to treated his hyperlipidemia with diet and exercise.     Today is feeling well on a cardiac standpoint, denies chest pain, shortness of breath, PND, orthopnea, presyncope, syncope, edema, heart racing, or palpitations.  He has had no recurrence of syncope.    Blood pressure is well controlled at 115/75  BMI 26.34, down approximately 9 pounds over the last 3 months after starting the keto diet  Activity level is unchanged                     Assessment and Plan:     1.  Syncope -single episode of syncope in February 2019.  No recurrence.  Follow-up Holter monitor demonstrated normal sinus rhythm with a right bundle branch block and variable QRS  morphology throughout.  With average heart rate of 62 no pauses were noted. There were 279 isolated supraventricular ectopics, 14 couplets, and 12 runs totaling 53 beats.      2.  Elevated calcium score -CT calcium score of 241.99, statin therapy recommended.  Patient declines statins at this time and wishes to treat with diet and exercise alone. Should he reconsider, start Lipitor 20mg daily with follow up FLP in 8 weeks.            Thank you for allowing me to participate in this delightful patient's care. I have recommended follow up with PCP and  Heart as needed.        Umu Varner, APRN, CNP          Review of Systems:   Review of Systems:  Skin:  Negative     Eyes:  Positive for cataracts  ENT:  Positive for hearing loss  Respiratory:  Negative    Cardiovascular:  Negative;palpitations;chest pain;lightheadedness;syncope or near-syncope;cyanosis;exercise intolerance;fatigue;edema    Gastroenterology: Negative    Genitourinary:  not assessed    Musculoskeletal:  Negative    Neurologic:  Negative    Psychiatric:  Negative    Heme/Lymph/Imm:  Negative    Endocrine:  Negative                Physical Exam:     GEN:  In general, this is a well nourished male in no acute distress.  HEENT:  Pupils equal, round. Sclerae nonicteric. Clear oropharynx. Mucous membranes moist.  NECK: Supple, no masses appreciated. Trachea midline. No JVD   C/V:  Regular rate and rhythm, no murmur, rub or gallop. No S3 or RV heave.   RESP: Respirations are unlabored. No use of accessory muscles. Clear to auscultation bilaterally without wheezing, rales, or rhonchi.  GI: Abdomen soft, nontender, nondistended. No HSM appreciated.   EXTREM: no LE edema. No cyanosis or clubbing.  NEURO: Alert and oriented, cooperative. No obvious focal deficits.   PSYCH: Normal affect.  SKIN: Warm and dry. No rashes or petechiae appreciated.          Past Medical History:     Past Medical History:   Diagnosis Date     Allergic state      Syncope                Past Surgical History:     Past Surgical History:   Procedure Laterality Date     HERNIA REPAIR                Allergies:   Patient has no known allergies.       Data:   All laboratory data reviewed:    LAST CHOLESTEROL:  Lab Results   Component Value Date    CHOL 230 02/05/2019     Lab Results   Component Value Date    HDL 57 02/05/2019     Lab Results   Component Value Date     02/05/2019     Lab Results   Component Value Date    TRIG 140 02/05/2019     Lab Results   Component Value Date    CHOLHDLRATIO 3.7 09/10/2010       LAST BMP:  Lab Results   Component Value Date     02/04/2019      Lab Results   Component Value Date    POTASSIUM 4.1 02/04/2019     Lab Results   Component Value Date    CHLORIDE 107 02/04/2019     Lab Results   Component Value Date    MAXINE 9.1 02/04/2019     Lab Results   Component Value Date    CO2 28 02/04/2019     Lab Results   Component Value Date    BUN 17 02/04/2019     Lab Results   Component Value Date    CR 0.89 02/04/2019     Lab Results   Component Value Date    GLC 86 02/04/2019       LAST CBC:  Lab Results   Component Value Date    WBC 5.7 02/05/2019     Lab Results   Component Value Date    RBC 4.47 02/05/2019     Lab Results   Component Value Date    HGB 14.5 02/05/2019     Lab Results   Component Value Date    HCT 41.7 02/05/2019     Lab Results   Component Value Date    MCV 93 02/05/2019     Lab Results   Component Value Date    MCH 32.4 02/05/2019     Lab Results   Component Value Date    MCHC 34.8 02/05/2019     Lab Results   Component Value Date    RDW 13.7 02/05/2019     Lab Results   Component Value Date     02/05/2019

## 2019-05-22 NOTE — LETTER
5/22/2019    Bill Mcfadden MD  [facility] [address] [place] [id number]    RE: Supa Garcia       Dear Colleague,    I had the pleasure of seeing Supa Garcia in the [facility].    Cardiology Clinic Progress Note  Supa Garcia MRN# [id number]   YOB: 1945 Age: 74 year old     Reason For Visit:  3-month follow up   Primary Cardiologist:   Dr. Downs           History of Presenting Illness:    Supa Garcia is a pleasant 74 year old patient who carries a past medical history significant for family history of premature coronary artery disease, hyperlipidemia, and recent episode of syncope.    He was last seen on 2/21/2019  and recommended he wear a holter monitor to rule out arrhythmia. Monitor demonstrated the principle rhythm was sinus with RBBB and variable QRS morphology throughout. The average heart rate was 62 bpm. There were no pauses greater than 2.0 seconds. There were 279 isolated supraventricular ectopics, 14 couplets, and 12 runs totaling 53 beats. The longest run was 9 beats. His recent cardiac work-up consisted of a CT coronary calcium score of 241.99, placing him in the 50th percentile when compared to age and gender matched control group.  Last lipid panel showed a total cholesterol of 230, HDL 57, , triglycerides 140.  He was recommended moderate to high intensity statin but elected to not start it. He states his daughter and son-in-law are chiropractors and advised against statins. He has opted to treated his hyperlipidemia with diet and exercise.     Today is feeling well on a cardiac standpoint, denies chest pain, shortness of breath, PND, orthopnea, presyncope, syncope, edema, heart racing, or palpitations.  He has had no recurrence of syncope.    Blood pressure is well controlled at 115/75  BMI 26.34, down approximately 9 pounds over the last 3 months after starting the keto diet  Activity level  is unchanged                     Assessment and Plan:     1.  Syncope -single episode of syncope in February 2019.  No recurrence.  Follow-up Holter monitor demonstrated normal sinus rhythm with a right bundle branch block and variable QRS morphology throughout.  With average heart rate of 62 no pauses were noted. There were 279 isolated supraventricular ectopics, 14 couplets, and 12 runs totaling 53 beats.      2.  Elevated calcium score -CT calcium score of 241.99, statin therapy recommended.  Patient declines statins at this time and wishes to treat with diet and exercise alone. Should he reconsider, start Lipitor 20mg daily with follow up FLP in 8 weeks.            Thank you for allowing me to participate in this delightful patient's care. I have recommended follow up with PCP and  Heart as needed.        Umu Varner, APRN, CNP          Review of Systems:   Review of Systems:  Skin:  Negative     Eyes:  Positive for cataracts  ENT:  Positive for hearing loss  Respiratory:  Negative    Cardiovascular:  Negative;palpitations;chest pain;lightheadedness;syncope or near-syncope;cyanosis;exercise intolerance;fatigue;edema    Gastroenterology: Negative    Genitourinary:  not assessed    Musculoskeletal:  Negative    Neurologic:  Negative    Psychiatric:  Negative    Heme/Lymph/Imm:  Negative    Endocrine:  Negative                Physical Exam:     GEN:  In general, this is a well nourished male in no acute distress.  HEENT:  Pupils equal, round. Sclerae nonicteric. Clear oropharynx. Mucous membranes moist.  NECK: Supple, no masses appreciated. Trachea midline. No JVD   C/V:  Regular rate and rhythm, no murmur, rub or gallop. No S3 or RV heave.   RESP: Respirations are unlabored. No use of accessory muscles. Clear to auscultation bilaterally without wheezing, rales, or rhonchi.  GI: Abdomen soft, nontender, nondistended. No HSM appreciated.   EXTREM: no LE edema. No cyanosis or clubbing.  NEURO: Alert and  oriented, cooperative. No obvious focal deficits.   PSYCH: Normal affect.  SKIN: Warm and dry. No rashes or petechiae appreciated.          Past Medical History:     Past Medical History:   Diagnosis Date     Allergic state      Syncope               Past Surgical History:     Past Surgical History:   Procedure Laterality Date     HERNIA REPAIR                Allergies:   Patient has no known allergies.       Data:   All laboratory data reviewed:    LAST CHOLESTEROL:  Lab Results   Component Value Date    CHOL 230 02/05/2019     Lab Results   Component Value Date    HDL 57 02/05/2019     Lab Results   Component Value Date     02/05/2019     Lab Results   Component Value Date    TRIG 140 02/05/2019     Lab Results   Component Value Date    CHOLHDLRATIO 3.7 09/10/2010       LAST BMP:  Lab Results   Component Value Date     02/04/2019      Lab Results   Component Value Date    POTASSIUM 4.1 02/04/2019     Lab Results   Component Value Date    CHLORIDE 107 02/04/2019     Lab Results   Component Value Date    MAXINE 9.1 02/04/2019     Lab Results   Component Value Date    CO2 28 02/04/2019     Lab Results   Component Value Date    BUN 17 02/04/2019     Lab Results   Component Value Date    CR 0.89 02/04/2019     Lab Results   Component Value Date    GLC 86 02/04/2019       LAST CBC:  Lab Results   Component Value Date    WBC 5.7 02/05/2019     Lab Results   Component Value Date    RBC 4.47 02/05/2019     Lab Results   Component Value Date    HGB 14.5 02/05/2019     Lab Results   Component Value Date    HCT 41.7 02/05/2019     Lab Results   Component Value Date    MCV 93 02/05/2019     Lab Results   Component Value Date    MCH 32.4 02/05/2019     Lab Results   Component Value Date    MCHC 34.8 02/05/2019     Lab Results   Component Value Date    RDW 13.7 02/05/2019     Lab Results   Component Value Date     02/05/2019             Thank you for allowing me to participate in the care of your  patient.      Sincerely,     ELIOT [facility]

## 2019-05-22 NOTE — PATIENT INSTRUCTIONS
Thanks for coming into [facility] today.    Reviewed results of holter monitor  Elected to stop statin with only diet and exercise management.  Recommend starting stating due to elevated LDL  Continue to monitor symptoms, notify office should symptoms return.     Please call my nurse at [phone] with any questions or concerns.    Scheduling phone number: [phone]  Reminder: Please bring in all current medications, over the counter supplements and vitamin bottles to your next appointment.

## 2019-09-26 NOTE — TELEPHONE ENCOUNTER
RECORDS RECEIVED FROM: Internal/Care Everywhere   DATE RECEIVED: 10-7-19   NOTES STATUS DETAILS   OFFICE NOTE from referring provider    Internal 5-22-19 WILLIAM Varner   OFFICE NOTE from other cardiologists   and neurologists Internal 5-22-19 WILLIAM Varner   DISCHARGE SUMMARY from hospital    N/A    DISCHARGE REPORT from the ER   Internal 2-4-19   OPERATIVE REPORT    N/A    MEDICATION LIST   Internal    LABS     BMP   Internal 2-4-19   CBC   Internal 2-5-19   CMP   In process 1-30-19   Lipids   Internal 2-5-19   TSH   N/A    DIAGNOSTIC PROCEDURES     EKG  Strips *important Internal 2-4-19   Monitor Reports  Strips *important Internal 2-28-19   Cardioversions   N/A    ICD/pacemaker implant       Tilt table studies   N/A    IMAGING (DISC & REPORT)      ECHO's   Internal 2-5-19   Stress Tests   Internal 2-5-19   Cath   N/A    CT/CTA   Internal 2-5-19   MRI/MRA   N/A      Action    Action Taken 9-26: Requested ekg strip 1-30-19 from [facility] in [place] [phone]  10-4: Sent second request for EKG strip  10-4: Received EKG strip from [facility] and sent to scanning

## 2019-10-05 ASSESSMENT — ENCOUNTER SYMPTOMS
CHILLS: 0
STIFFNESS: 0
DECREASED APPETITE: 0
FEVER: 0
BACK PAIN: 1
LEG PAIN: 0
POLYPHAGIA: 0
MUSCLE WEAKNESS: 0
JOINT SWELLING: 0
MUSCLE CRAMPS: 0
HALLUCINATIONS: 0
ALTERED TEMPERATURE REGULATION: 0
LIGHT-HEADEDNESS: 1
WEIGHT GAIN: 0
EXERCISE INTOLERANCE: 0
NECK PAIN: 0
INCREASED ENERGY: 1
POLYDIPSIA: 0
SYNCOPE: 1
PALPITATIONS: 1
SLEEP DISTURBANCES DUE TO BREATHING: 0
WEIGHT LOSS: 0
HYPOTENSION: 1
ORTHOPNEA: 0
HYPERTENSION: 0
MYALGIAS: 0
NIGHT SWEATS: 0
FATIGUE: 1
ARTHRALGIAS: 0

## 2019-10-07 ENCOUNTER — DOCUMENTATION ONLY (OUTPATIENT)
Dept: CARE COORDINATION | Facility: CLINIC | Age: 74
End: 2019-10-07

## 2019-10-07 ENCOUNTER — OFFICE VISIT (OUTPATIENT)
Dept: CARDIOLOGY | Facility: CLINIC | Age: 74
End: 2019-10-07
Attending: INTERNAL MEDICINE
Payer: MEDICARE

## 2019-10-07 ENCOUNTER — PRE VISIT (OUTPATIENT)
Dept: CARDIOLOGY | Facility: CLINIC | Age: 74
End: 2019-10-07

## 2019-10-07 VITALS
SYSTOLIC BLOOD PRESSURE: 126 MMHG | OXYGEN SATURATION: 98 % | HEIGHT: 70 IN | BODY MASS INDEX: 26.92 KG/M2 | WEIGHT: 188 LBS | DIASTOLIC BLOOD PRESSURE: 79 MMHG | HEART RATE: 62 BPM

## 2019-10-07 DIAGNOSIS — R55 VASOVAGAL SYNCOPE: Primary | ICD-10-CM

## 2019-10-07 PROCEDURE — 93005 ELECTROCARDIOGRAM TRACING: CPT | Mod: ZF

## 2019-10-07 PROCEDURE — 99214 OFFICE O/P EST MOD 30 MIN: CPT | Mod: ZP | Performed by: INTERNAL MEDICINE

## 2019-10-07 PROCEDURE — 0298T ZZC EXT ECG > 48HR TO 21 DAY REVIEW AND INTERPRETATN: CPT | Mod: ZP | Performed by: INTERNAL MEDICINE

## 2019-10-07 PROCEDURE — 93010 ELECTROCARDIOGRAM REPORT: CPT | Mod: ZP | Performed by: INTERNAL MEDICINE

## 2019-10-07 PROCEDURE — G0463 HOSPITAL OUTPT CLINIC VISIT: HCPCS | Mod: 25,ZF

## 2019-10-07 ASSESSMENT — PAIN SCALES - GENERAL: PAINLEVEL: NO PAIN (0)

## 2019-10-07 ASSESSMENT — MIFFLIN-ST. JEOR: SCORE: 1589.01

## 2019-10-07 NOTE — LETTER
10/7/2019       RE: Supa Garcia  [address] [facility] [address]     Dear Colleague,    Thank you for referring your patient, Supa Garcia, to the [facility] HEART [facility] at [facility]. Please see a copy of my visit note below.    Referring provider: Rakesh Copeland    Chief complaint: syncope    HPI: Mr. Supa Garcia is a 74 year old  male with PMH significant for syncope after sexual intercourse.    The patient has a history of 2 episodes of syncope both after sex.  First episode occurred January of this year and last episode on August 25.  The patient reports lightheadedness, ringing in his ears and dizziness after the second event on August 25.  Denies chest pain or dyspnea on exertion.  Patient was out for 30 seconds per his wife.  She reports his color was pale.  When he woke up the patient felt very tired and felt his heart was racing.  He felt sweaty.  Wife reports that he came back very quick and they did not seek medical attention.  However after the first episode the patient's loss of consciousness lasted longer.  She performed CPR and called EMS.  Patient denies using any medication prior to sex.  Denies drug abuse.    Patient denies any other syncopal episodes apart from these 2 episodes after sex.  He is physically active and denies exertional symptoms.  No prior history of cardiac disease. The patient denies a history of chest discomfort, dyspnea, PND, orthopnea, pedal edema, palpitations, or lightheadedness. In ROS, he reports decreased libido and erectile dysfunction.    After the first episode the patient underwent thorough investigation with exercise stress echocardiogram, 48-hour Holter monitor and coronary artery calcium score.  Stress test was normal.  There was no evidence of tachy or bradycardia arrhythmia on the Holter monitor.  Patient's coronary calcium was 241 and he was recommended statins however the patient  declined.      No history of hypertension, diabetes, or tobacco abuse.  No drug abuse.    Patient is not on any medications.  Patient's last echocardiogram from [facility] on 7/1/2019 showed a structurally normal heart with no valvular disease.  Patient also underwent nuclear stress imaging on 9/6/2019 which showed normal perfusion and normal exercise stress EKG.  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    Medications, personal, family, and social history reviewed with patient and revised.    PAST MEDICAL HISTORY:  Past Medical History:   Diagnosis Date     Allergic state      Syncope        CURRENT MEDICATIONS:  Current Outpatient Medications   Medication Sig Dispense Refill     CITRUS BERGAMOT PO Take by mouth daily       Coenzyme Q10 (COQ10) 200 MG CAPS Take by mouth daily       fish oil-omega-3 fatty acids 1000 MG capsule Take 1 g by mouth daily       multivitamin (OCUVITE) TABS tablet Take 1 tablet by mouth daily       UNABLE TO FIND Ganoderma lucidum daily       Vitamin D-Vitamin K (VITAMIN K2-VITAMIN D3 PO) Take by mouth daily       testosterone enanthate (DELATESTRYL) 200 MG/ML injection Inject into the muscle every 14 days       UNABLE TO FIND Adapten dietary supplement, one daily         PAST SURGICAL HISTORY:  Past Surgical History:   Procedure Laterality Date     HERNIA REPAIR         ALLERGIES:   No Known Allergies    FAMILY HISTORY:  Family History   Problem Relation Age of Onset     Hyperlipidemia Father      Coronary Artery Disease Father      Coronary Artery Disease Brother          SOCIAL HISTORY:  Social History     Tobacco Use     Smoking status: Never Smoker     Smokeless tobacco: Never Used   Substance Use Topics     Alcohol use: Yes     Frequency: Never     Comment: social     Drug use: No       ROS:   Answers for HPI/ROS submitted by the patient on 10/5/2019   General Symptoms:  "Yes  Skin Symptoms: No  HENT Symptoms: No  EYE SYMPTOMS: No  HEART SYMPTOMS: Yes  LUNG SYMPTOMS: No  INTESTINAL SYMPTOMS: No  URINARY SYMPTOMS: No  REPRODUCTIVE SYMPTOMS: Yes  SKELETAL SYMPTOMS: Yes  BLOOD SYMPTOMS: No  NERVOUS SYSTEM SYMPTOMS: No  MENTAL HEALTH SYMPTOMS: No  Fever: No  Loss of appetite: No  Weight loss: No  Weight gain: No  Fatigue: Yes  Night sweats: No  Chills: No  Increased stress: No  Excessive hunger: No  Excessive thirst: No  Feeling hot or cold when others believe the temperature is normal: No  Loss of height: No  Post-operative complications: No  Surgical site pain: No  Hallucinations: No  Change in or Loss of Energy: Yes  Hyperactivity: No  Confusion: No  Chest pain or pressure: No  Fast or irregular heartbeat: Yes  Pain in legs with walking: No  Trouble breathing while lying down: No  Fingers or toes appear blue: No  High blood pressure: No  Low blood pressure: Yes  Fainting: Yes  Murmurs: No  Pacemaker: No  Varicose veins: No  Edema or swelling: No  Wake up at night with shortness of breath: No  Light-headedness: Yes  Exercise intolerance: No  Back pain: Yes  Muscle aches: No  Neck pain: No  Swollen joints: No  Joint pain: No  Bone pain: No  Muscle cramps: No  Muscle weakness: No  Joint stiffness: No  Bone fracture: No  Scrotal pain or swelling: No  Erectile dysfunction: Yes  Penile discharge: No  Genital ulcers: No  Reduced libido: Yes    Exam:  /79 (BP Location: Right arm, Patient Position: Chair, Cuff Size: Adult Regular)   Pulse 62   Ht 1.778 m (5' 10\")   Wt 85.3 kg (188 lb)   SpO2 98%   BMI 26.98 kg/m     GENERAL APPEARANCE: alert and no distress  HEENT: no icterus, no central cyanosis  LYMPH/NECK: no adenopathy, no asymmetry, JVP not elevated, no carotid bruits.  RESPIRATORY: lungs clear to auscultation - no rales, rhonchi or wheezes, no use of accessory muscles, no retractions, respirations are unlabored, normal respiratory rate  CARDIOVASCULAR: regular rhythm, normal " S1, S2, no S3 or S4 and no murmur, click or rub, precordium quiet with normal PMI.  GI: soft, non tender  EXTREMITIES: peripheral pulses normal, no edema  NEURO: alert, normal speech,and affect  VASC: Radial, dorsalis pedis and posterior tibialis pulses are normal in volumes and symmetric bilaterally.   SKIN: no ecchymoses, no rashes     I have reviewed the labs and personally reviewed the imaging below and made my comment in the assessment and plan.    Labs:  CBC RESULTS:   Lab Results   Component Value Date    WBC 5.7 02/05/2019    RBC 4.47 02/05/2019    HGB 14.5 02/05/2019    HCT 41.7 02/05/2019    MCV 93 02/05/2019    MCH 32.4 02/05/2019    MCHC 34.8 02/05/2019    RDW 13.7 02/05/2019     02/05/2019       BMP RESULTS:  Lab Results   Component Value Date     02/04/2019    POTASSIUM 4.1 02/04/2019    CHLORIDE 107 02/04/2019    CO2 28 02/04/2019    ANIONGAP 5 02/04/2019    GLC 86 02/04/2019    BUN 17 02/04/2019    CR 0.89 02/04/2019    GFRESTIMATED 83 02/04/2019    GFRESTBLACK >90 02/04/2019    MAXINE 9.1 02/04/2019      Exercise Nuclear Stress test [facility] 9/6/2019  Myocardial perfusion imaging is normal without evidence of infarct or   ischemia.     Stress test findings suggests that patient is low risk (<1% annual cardiac   mortality rate).     Stress ECG with no significant ST changes or arrhythmias concerning for   inducible ischemia.    Normal functional capacity with 9 mets and no angina.      Echocardiogram [facility] 7/1/2019  1. Normal left ventricular chamber size and wall thickness. Calculated ejection fraction 69%.  2. No regional wall motion abnormalities.  3. Findings consistent with normal left ventricular filling pressure.  4. Normal right ventricular size and systolic function.  5. Estimated right ventricular systolic pressure 24 mmHg (systolic blood pressure 120 mmHg).  6. Normal cardiac valves.  7. Normal inferior vena cava size with normal inspiratory collapse (>50%).  8. No  pericardial effusion.    CT calcium score 2/5/2019  CORONARY ARTERY CALCIUM SCORES:      Left main coronary artery: 78.01  Left anterior descending coronary artery: 163.98  Circumflex coronary artery: 0   Right coronary artery: 0     TOTAL CALCIUM SCORE: 241.99    Holter monitor 48-hour 2/28/2019      EKG 10/7/2019 sinus rhythm bifascicular block with left anterior fascicular block and right bundle branch block.  Normal [place] interval.  No ST-T wave changes.    Assessment and Plan:   Mr. Supa Garcia is a 74 year old  male with PMH significant for syncope after sex.    Syncope after sexual intercourse x 2: First event occurred in January and the last event in August of this year.  No other episodes of syncope.  Patient's cardiac investigation is normal so far based on stress test and transthoracic echocardiogram.  CTA showed mild elevated coronary calcium score and he was recommended to start statin but the patient declines at this point.  I reviewed patient's labs which show normal CBC, BMP and thyroid hormone.  I am highly suspecting the patient's syncopal episodes are due to vasovagal syncope.  Recommended electrolyte drinks in the form of Gatorade or Powerade particularly before sex, improving hydration overall and avoiding dehydration.  I recommended 1 month EKG monitoring to monitor rhythm if he happens to have another episode during sex.    I will review the results of the EKG monitor and if it is unremarkable have no further recommendation from cardiac standpoint.    Return to clinic as needed.    A total of 30 minutes spent face-toface with greater than 50% of the time spent in counseling and coordinating cares of the issues above.     Please donot hesitate to contact me if you have any questions or concerns. Again, thank you for allowing me to participate in the care of your patient.    Dominick DELUNA MD  [facility] Division of Cardiology  Pager [phone]      A

## 2019-10-07 NOTE — PATIENT INSTRUCTIONS
Patient Instructions:  Dr. Lopez recommends:  -- increasing intake of electrolyte beverages prior to sexual activity. For example : Propel, Powerade, Gatorade, Pedialyte, etc.   --avoid alcohol consumption    Monitor placed today for 30 days - as soon as results are compiled and reviewed, you will be notified. Please allow 1-2 weeks for processing.      It was a pleasure to see you in the cardiology clinic today.    We are encouraging the use of [facility] to communicate with your Healthcare Provider.  If you have any questions, call  Tang Gardner LPN, at [phone].  Press Option #1 for the [facility], and then press Option #4 for nursing.  Cardiology Fax  : [phone]      If you have an urgent need after hours (8:00 am to 4:30 pm) please call [phone] and ask for the cardiology fellow on call.

## 2019-10-07 NOTE — PROGRESS NOTES
Referring provider: Rakesh Copeland    Chief complaint: syncope    HPI: Mr. Supa Garcia is a 74 year old  male with PMH significant for syncope after sexual intercourse.    The patient has a history of 2 episodes of syncope both after sex.  First episode occurred January of this year and last episode on August 25.  The patient reports lightheadedness, ringing in his ears and dizziness after the second event on August 25.  Denies chest pain or dyspnea on exertion.  Patient was out for 30 seconds per his wife.  She reports his color was pale.  When he woke up the patient felt very tired and felt his heart was racing.  He felt sweaty.  Wife reports that he came back very quick and they did not seek medical attention.  However after the first episode the patient's loss of consciousness lasted longer.  She performed CPR and called EMS.  Patient denies using any medication prior to sex.  Denies drug abuse.    Patient denies any other syncopal episodes apart from these 2 episodes after sex.  He is physically active and denies exertional symptoms.  No prior history of cardiac disease. The patient denies a history of chest discomfort, dyspnea, PND, orthopnea, pedal edema, palpitations, or lightheadedness. In ROS, he reports decreased libido and erectile dysfunction.    After the first episode the patient underwent thorough investigation with exercise stress echocardiogram, 48-hour Holter monitor and coronary artery calcium score.  Stress test was normal.  There was no evidence of tachy or bradycardia arrhythmia on the Holter monitor.  Patient's coronary calcium was 241 and he was recommended statins however the patient declined.      No history of hypertension, diabetes, or tobacco abuse.  No drug abuse.    Patient is not on any medications.  Patient's last echocardiogram from [facility] on 7/1/2019 showed a structurally normal heart with no valvular disease.  Patient also underwent nuclear stress imaging  on 9/6/2019 which showed normal perfusion and normal exercise stress EKG.  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    Medications, personal, family, and social history reviewed with patient and revised.    PAST MEDICAL HISTORY:  Past Medical History:   Diagnosis Date     Allergic state      Syncope        CURRENT MEDICATIONS:  Current Outpatient Medications   Medication Sig Dispense Refill     CITRUS BERGAMOT PO Take by mouth daily       Coenzyme Q10 (COQ10) 200 MG CAPS Take by mouth daily       fish oil-omega-3 fatty acids 1000 MG capsule Take 1 g by mouth daily       multivitamin (OCUVITE) TABS tablet Take 1 tablet by mouth daily       UNABLE TO FIND Ganoderma lucidum daily       Vitamin D-Vitamin K (VITAMIN K2-VITAMIN D3 PO) Take by mouth daily       testosterone enanthate (DELATESTRYL) 200 MG/ML injection Inject into the muscle every 14 days       UNABLE TO FIND Adapten dietary supplement, one daily         PAST SURGICAL HISTORY:  Past Surgical History:   Procedure Laterality Date     HERNIA REPAIR         ALLERGIES:   No Known Allergies    FAMILY HISTORY:  Family History   Problem Relation Age of Onset     Hyperlipidemia Father      Coronary Artery Disease Father      Coronary Artery Disease Brother          SOCIAL HISTORY:  Social History     Tobacco Use     Smoking status: Never Smoker     Smokeless tobacco: Never Used   Substance Use Topics     Alcohol use: Yes     Frequency: Never     Comment: social     Drug use: No       ROS:   Answers for HPI/ROS submitted by the patient on 10/5/2019   General Symptoms: Yes  Skin Symptoms: No  HENT Symptoms: No  EYE SYMPTOMS: No  HEART SYMPTOMS: Yes  LUNG SYMPTOMS: No  INTESTINAL SYMPTOMS: No  URINARY SYMPTOMS: No  REPRODUCTIVE SYMPTOMS: Yes  SKELETAL SYMPTOMS: Yes  BLOOD SYMPTOMS: No  NERVOUS SYSTEM SYMPTOMS: No  MENTAL HEALTH SYMPTOMS: No  Fever: No  Loss of  "appetite: No  Weight loss: No  Weight gain: No  Fatigue: Yes  Night sweats: No  Chills: No  Increased stress: No  Excessive hunger: No  Excessive thirst: No  Feeling hot or cold when others believe the temperature is normal: No  Loss of height: No  Post-operative complications: No  Surgical site pain: No  Hallucinations: No  Change in or Loss of Energy: Yes  Hyperactivity: No  Confusion: No  Chest pain or pressure: No  Fast or irregular heartbeat: Yes  Pain in legs with walking: No  Trouble breathing while lying down: No  Fingers or toes appear blue: No  High blood pressure: No  Low blood pressure: Yes  Fainting: Yes  Murmurs: No  Pacemaker: No  Varicose veins: No  Edema or swelling: No  Wake up at night with shortness of breath: No  Light-headedness: Yes  Exercise intolerance: No  Back pain: Yes  Muscle aches: No  Neck pain: No  Swollen joints: No  Joint pain: No  Bone pain: No  Muscle cramps: No  Muscle weakness: No  Joint stiffness: No  Bone fracture: No  Scrotal pain or swelling: No  Erectile dysfunction: Yes  Penile discharge: No  Genital ulcers: No  Reduced libido: Yes    Exam:  /79 (BP Location: Right arm, Patient Position: Chair, Cuff Size: Adult Regular)   Pulse 62   Ht 1.778 m (5' 10\")   Wt 85.3 kg (188 lb)   SpO2 98%   BMI 26.98 kg/m    GENERAL APPEARANCE: alert and no distress  HEENT: no icterus, no central cyanosis  LYMPH/NECK: no adenopathy, no asymmetry, JVP not elevated, no carotid bruits.  RESPIRATORY: lungs clear to auscultation - no rales, rhonchi or wheezes, no use of accessory muscles, no retractions, respirations are unlabored, normal respiratory rate  CARDIOVASCULAR: regular rhythm, normal S1, S2, no S3 or S4 and no murmur, click or rub, precordium quiet with normal PMI.  GI: soft, non tender  EXTREMITIES: peripheral pulses normal, no edema  NEURO: alert, normal speech,and affect  VASC: Radial, dorsalis pedis and posterior tibialis pulses are normal in volumes and symmetric " bilaterally.   SKIN: no ecchymoses, no rashes     I have reviewed the labs and personally reviewed the imaging below and made my comment in the assessment and plan.    Labs:  CBC RESULTS:   Lab Results   Component Value Date    WBC 5.7 02/05/2019    RBC 4.47 02/05/2019    HGB 14.5 02/05/2019    HCT 41.7 02/05/2019    MCV 93 02/05/2019    MCH 32.4 02/05/2019    MCHC 34.8 02/05/2019    RDW 13.7 02/05/2019     02/05/2019       BMP RESULTS:  Lab Results   Component Value Date     02/04/2019    POTASSIUM 4.1 02/04/2019    CHLORIDE 107 02/04/2019    CO2 28 02/04/2019    ANIONGAP 5 02/04/2019    GLC 86 02/04/2019    BUN 17 02/04/2019    CR 0.89 02/04/2019    GFRESTIMATED 83 02/04/2019    GFRESTBLACK >90 02/04/2019    MAXINE 9.1 02/04/2019      Exercise Nuclear Stress test [facility] 9/6/2019  Myocardial perfusion imaging is normal without evidence of infarct or   ischemia.     Stress test findings suggests that patient is low risk (<1% annual cardiac   mortality rate).     Stress ECG with no significant ST changes or arrhythmias concerning for   inducible ischemia.    Normal functional capacity with 9 mets and no angina.      Echocardiogram [facility] 7/1/2019  1. Normal left ventricular chamber size and wall thickness. Calculated ejection fraction 69%.  2. No regional wall motion abnormalities.  3. Findings consistent with normal left ventricular filling pressure.  4. Normal right ventricular size and systolic function.  5. Estimated right ventricular systolic pressure 24 mmHg (systolic blood pressure 120 mmHg).  6. Normal cardiac valves.  7. Normal inferior vena cava size with normal inspiratory collapse (>50%).  8. No pericardial effusion.    CT calcium score 2/5/2019  CORONARY ARTERY CALCIUM SCORES:      Left main coronary artery: 78.01  Left anterior descending coronary artery: 163.98  Circumflex coronary artery: 0   Right coronary artery: 0     TOTAL CALCIUM SCORE: 241.99    Holter monitor 48-hour  2/28/2019      EKG 10/7/2019 sinus rhythm bifascicular block with left anterior fascicular block and right bundle branch block.  Normal [place] interval.  No ST-T wave changes.    Assessment and Plan:   Mr. Supa Garcia is a 74 year old  male with PMH significant for syncope after sex.    Syncope after sexual intercourse x 2: First event occurred in January and the last event in August of this year.  No other episodes of syncope.  Patient's cardiac investigation is normal so far based on stress test and transthoracic echocardiogram.  CTA showed mild elevated coronary calcium score and he was recommended to start statin but the patient declines at this point.  I reviewed patient's labs which show normal CBC, BMP and thyroid hormone.  I am highly suspecting the patient's syncopal episodes are due to vasovagal syncope.  Recommended electrolyte drinks in the form of Gatorade or Powerade particularly before sex, improving hydration overall and avoiding dehydration.  I recommended 1 month EKG monitoring to monitor rhythm if he happens to have another episode during sex.    I will review the results of the EKG monitor and if it is unremarkable have no further recommendation from cardiac standpoint.    Return to clinic as needed.    A total of 30 minutes spent face-toface with greater than 50% of the time spent in counseling and coordinating cares of the issues above.     Please donot hesitate to contact me if you have any questions or concerns. Again, thank you for allowing me to participate in the care of your patient.    Dominick DELUNA MD  [facility] Division of Cardiology  Pager [phone]      A

## 2019-10-07 NOTE — LETTER
10/7/2019      RE: Supa Garcia  [id number] [facility] [address]       Dear Colleague,    Thank you for the opportunity to participate in the care of your patient, Supa Garcia, at the [facility] at [facility]. Please see a copy of my visit note below.    Referring provider: Rakesh Copeland    Chief complaint: syncope    HPI: Mr. Supa Garcia is a 74 year old  male with PMH significant for syncope after sexual intercourse.    The patient has a history of 2 episodes of syncope both after sex.  First episode occurred January of this year and last episode on August 25.  The patient reports lightheadedness, ringing in his ears and dizziness after the second event on August 25.  Denies chest pain or dyspnea on exertion.  Patient was out for 30 seconds per his wife.  She reports his color was pale.  When he woke up the patient felt very tired and felt his heart was racing.  He felt sweaty.  Wife reports that he came back very quick and they did not seek medical attention.  However after the first episode the patient's loss of consciousness lasted longer.  She performed CPR and called EMS.  Patient denies using any medication prior to sex.  Denies drug abuse.    Patient denies any other syncopal episodes apart from these 2 episodes after sex.  He is physically active and denies exertional symptoms.  No prior history of cardiac disease. The patient denies a history of chest discomfort, dyspnea, PND, orthopnea, pedal edema, palpitations, or lightheadedness. In ROS, he reports decreased libido and erectile dysfunction.    After the first episode the patient underwent thorough investigation with exercise stress echocardiogram, 48-hour Holter monitor and coronary artery calcium score.  Stress test was normal.  There was no evidence of tachy or bradycardia arrhythmia on the Holter monitor.  Patient's coronary calcium was 241 and he was  recommended statins however the patient declined.      No history of hypertension, diabetes, or tobacco abuse.  No drug abuse.    Patient is not on any medications.  Patient's last echocardiogram from [facility] on 7/1/2019 showed a structurally normal heart with no valvular disease.  Patient also underwent nuclear stress imaging on 9/6/2019 which showed normal perfusion and normal exercise stress EKG.  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    Medications, personal, family, and social history reviewed with patient and revised.    PAST MEDICAL HISTORY:  Past Medical History:   Diagnosis Date     Allergic state      Syncope        CURRENT MEDICATIONS:  Current Outpatient Medications   Medication Sig Dispense Refill     CITRUS BERGAMOT PO Take by mouth daily       Coenzyme Q10 (COQ10) 200 MG CAPS Take by mouth daily       fish oil-omega-3 fatty acids 1000 MG capsule Take 1 g by mouth daily       multivitamin (OCUVITE) TABS tablet Take 1 tablet by mouth daily       UNABLE TO FIND Ganoderma lucidum daily       Vitamin D-Vitamin K (VITAMIN K2-VITAMIN D3 PO) Take by mouth daily       testosterone enanthate (DELATESTRYL) 200 MG/ML injection Inject into the muscle every 14 days       UNABLE TO FIND [employer] dietary supplement, one daily         PAST SURGICAL HISTORY:  Past Surgical History:   Procedure Laterality Date     HERNIA REPAIR         ALLERGIES:   No Known Allergies    FAMILY HISTORY:  Family History   Problem Relation Age of Onset     Hyperlipidemia Father      Coronary Artery Disease Father      Coronary Artery Disease Brother          SOCIAL HISTORY:  Social History     Tobacco Use     Smoking status: Never Smoker     Smokeless tobacco: Never Used   Substance Use Topics     Alcohol use: Yes     Frequency: Never     Comment: social     Drug use: No       ROS:   Answers for HPI/ROS submitted by the  "patient on 10/5/2019   General Symptoms: Yes  Skin Symptoms: No  HENT Symptoms: No  EYE SYMPTOMS: No  HEART SYMPTOMS: Yes  LUNG SYMPTOMS: No  INTESTINAL SYMPTOMS: No  URINARY SYMPTOMS: No  REPRODUCTIVE SYMPTOMS: Yes  SKELETAL SYMPTOMS: Yes  BLOOD SYMPTOMS: No  NERVOUS SYSTEM SYMPTOMS: No  MENTAL HEALTH SYMPTOMS: No  Fever: No  Loss of appetite: No  Weight loss: No  Weight gain: No  Fatigue: Yes  Night sweats: No  Chills: No  Increased stress: No  Excessive hunger: No  Excessive thirst: No  Feeling hot or cold when others believe the temperature is normal: No  Loss of height: No  Post-operative complications: No  Surgical site pain: No  Hallucinations: No  Change in or Loss of Energy: Yes  Hyperactivity: No  Confusion: No  Chest pain or pressure: No  Fast or irregular heartbeat: Yes  Pain in legs with walking: No  Trouble breathing while lying down: No  Fingers or toes appear blue: No  High blood pressure: No  Low blood pressure: Yes  Fainting: Yes  Murmurs: No  Pacemaker: No  Varicose veins: No  Edema or swelling: No  Wake up at night with shortness of breath: No  Light-headedness: Yes  Exercise intolerance: No  Back pain: Yes  Muscle aches: No  Neck pain: No  Swollen joints: No  Joint pain: No  Bone pain: No  Muscle cramps: No  Muscle weakness: No  Joint stiffness: No  Bone fracture: No  Scrotal pain or swelling: No  Erectile dysfunction: Yes  Penile discharge: No  Genital ulcers: No  Reduced libido: Yes    Exam:  /79 (BP Location: Right arm, Patient Position: Chair, Cuff Size: Adult Regular)   Pulse 62   Ht 1.778 m (5' 10\")   Wt 85.3 kg (188 lb)   SpO2 98%   BMI 26.98 kg/m     GENERAL APPEARANCE: alert and no distress  HEENT: no icterus, no central cyanosis  LYMPH/NECK: no adenopathy, no asymmetry, JVP not elevated, no carotid bruits.  RESPIRATORY: lungs clear to auscultation - no rales, rhonchi or wheezes, no use of accessory muscles, no retractions, respirations are unlabored, normal respiratory " rate  CARDIOVASCULAR: regular rhythm, normal S1, S2, no S3 or S4 and no murmur, click or rub, precordium quiet with normal PMI.  GI: soft, non tender  EXTREMITIES: peripheral pulses normal, no edema  NEURO: alert, normal speech,and affect  VASC: Radial, dorsalis pedis and posterior tibialis pulses are normal in volumes and symmetric bilaterally.   SKIN: no ecchymoses, no rashes     I have reviewed the labs and personally reviewed the imaging below and made my comment in the assessment and plan.    Labs:  CBC RESULTS:   Lab Results   Component Value Date    WBC 5.7 02/05/2019    RBC 4.47 02/05/2019    HGB 14.5 02/05/2019    HCT 41.7 02/05/2019    MCV 93 02/05/2019    MCH 32.4 02/05/2019    MCHC 34.8 02/05/2019    RDW 13.7 02/05/2019     02/05/2019       BMP RESULTS:  Lab Results   Component Value Date     02/04/2019    POTASSIUM 4.1 02/04/2019    CHLORIDE 107 02/04/2019    CO2 28 02/04/2019    ANIONGAP 5 02/04/2019    GLC 86 02/04/2019    BUN 17 02/04/2019    CR 0.89 02/04/2019    GFRESTIMATED 83 02/04/2019    GFRESTBLACK >90 02/04/2019    MAXINE 9.1 02/04/2019      Exercise Nuclear Stress test [facility] 9/6/2019  Myocardial perfusion imaging is normal without evidence of infarct or   ischemia.     Stress test findings suggests that patient is low risk (<1% annual cardiac   mortality rate).     Stress ECG with no significant ST changes or arrhythmias concerning for   inducible ischemia.    Normal functional capacity with 9 mets and no angina.      Echocardiogram [facility] 7/1/2019  1. Normal left ventricular chamber size and wall thickness. Calculated ejection fraction 69%.  2. No regional wall motion abnormalities.  3. Findings consistent with normal left ventricular filling pressure.  4. Normal right ventricular size and systolic function.  5. Estimated right ventricular systolic pressure 24 mmHg (systolic blood pressure 120 mmHg).  6. Normal cardiac valves.  7. Normal inferior vena cava size with  normal inspiratory collapse (>50%).  8. No pericardial effusion.    CT calcium score 2/5/2019  CORONARY ARTERY CALCIUM SCORES:      Left main coronary artery: 78.01  Left anterior descending coronary artery: 163.98  Circumflex coronary artery: 0   Right coronary artery: 0     TOTAL CALCIUM SCORE: 241.99    Holter monitor 48-hour 2/28/2019      EKG 10/7/2019 sinus rhythm bifascicular block with left anterior fascicular block and right bundle branch block.  Normal VA interval.  No ST-T wave changes.    Assessment and Plan:   Mr. Supa Garcia is a 74 year old  male with PMH significant for syncope after sex.    Syncope after sexual intercourse x 2: First event occurred in January and the last event in August of this year.  No other episodes of syncope.  Patient's cardiac investigation is normal so far based on stress test and transthoracic echocardiogram.  CTA showed mild elevated coronary calcium score and he was recommended to start statin but the patient declines at this point.  I reviewed patient's labs which show normal CBC, BMP and thyroid hormone.  I am highly suspecting the patient's syncopal episodes are due to vasovagal syncope.  Recommended electrolyte drinks in the form of Gatorade or Powerade particularly before sex, improving hydration overall and avoiding dehydration.  I recommended 1 month EKG monitoring to monitor rhythm if he happens to have another episode during sex.    I will review the results of the EKG monitor and if it is unremarkable have no further recommendation from cardiac standpoint.    Return to clinic as needed.    A total of 30 minutes spent face-toface with greater than 50% of the time spent in counseling and coordinating cares of the issues above.     Please donot hesitate to contact me if you have any questions or concerns. Again, thank you for allowing me to participate in the care of your patient.    Dominick DELUNA MD  [facility] Division of Cardiology  Pager  [phone]      A    Please do not hesitate to contact me if you have any questions/concerns.     Sincerely,     Dominick Lopez MD

## 2019-10-07 NOTE — NURSING NOTE
Chief Complaint   Patient presents with     New Patient     follow up after 5-22-19 [facility] visit - per patient     Vitals were taken and medications were reconciled. EKG was performed    Ofelia GRANDE  1:17 PM

## 2019-10-08 ENCOUNTER — TELEPHONE (OUTPATIENT)
Dept: CARDIOLOGY | Facility: CLINIC | Age: 74
End: 2019-10-08

## 2019-10-08 LAB — INTERPRETATION ECG - MUSE: NORMAL

## 2019-10-08 NOTE — TELEPHONE ENCOUNTER
Per patient request, Faxed clinic notes from 10-7-19 to dr cassie may at fax number [phone] provided to me by patient, receipt confirmed via fax interface

## 2019-10-28 ENCOUNTER — TELEPHONE (OUTPATIENT)
Dept: CARDIOLOGY | Facility: CLINIC | Age: 74
End: 2019-10-28

## 2019-10-28 NOTE — TELEPHONE ENCOUNTER
[facility] Call Center    Phone Message    May a detailed message be left on voicemail: yes    Reason for Call: Other: Pt requesting call back. Pt stated that he misplaced his heart monitor, but Cindy has about 2 1/2 weeks of information ending last Friday 10/25. Pt needing to know if that is ok     Action Taken: Message routed to:  [facility] ([facility] cardio

## 2019-10-28 NOTE — TELEPHONE ENCOUNTER
Spoke with patient. He has called [employer] and they told him they were shipping out another monitor which he wants to do for a month. After he ships that one back he can get the results once they are in.

## 2019-11-09 ENCOUNTER — HEALTH MAINTENANCE LETTER (OUTPATIENT)
Age: 74
End: 2019-11-09

## 2019-11-27 DIAGNOSIS — R55 VASOVAGAL SYNCOPE: ICD-10-CM

## 2019-12-06 ENCOUNTER — TELEPHONE (OUTPATIENT)
Dept: CARDIOLOGY | Facility: CLINIC | Age: 74
End: 2019-12-06

## 2019-12-06 NOTE — TELEPHONE ENCOUNTER
Left VM:    If patient calls back please collect answers to the following question :    1. You lost the first [employer] monitor and they sent you a second one to be worn for 30 days. Did you receive ?    2. Did you wear it for 30 days and then return it by mail ?    3. If yes, when ? If not, when will you mail it ?

## 2020-02-23 ENCOUNTER — HEALTH MAINTENANCE LETTER (OUTPATIENT)
Age: 75
End: 2020-02-23

## 2020-03-25 ENCOUNTER — TRANSFERRED RECORDS (OUTPATIENT)
Dept: HEALTH INFORMATION MANAGEMENT | Facility: CLINIC | Age: 75
End: 2020-03-25

## 2020-05-18 ENCOUNTER — MEDICAL CORRESPONDENCE (OUTPATIENT)
Dept: HEALTH INFORMATION MANAGEMENT | Facility: CLINIC | Age: 75
End: 2020-05-18

## 2020-05-19 ENCOUNTER — TRANSCRIBE ORDERS (OUTPATIENT)
Dept: OTHER | Age: 75
End: 2020-05-19

## 2020-05-19 DIAGNOSIS — R55 SYNCOPE: Primary | ICD-10-CM

## 2020-09-15 ENCOUNTER — OFFICE VISIT (OUTPATIENT)
Dept: NEUROSURGERY | Facility: CLINIC | Age: 75
End: 2020-09-15
Attending: PHYSICIAN ASSISTANT
Payer: MEDICARE

## 2020-09-15 VITALS
HEART RATE: 67 BPM | DIASTOLIC BLOOD PRESSURE: 61 MMHG | BODY MASS INDEX: 26.82 KG/M2 | WEIGHT: 198 LBS | TEMPERATURE: 97.4 F | SYSTOLIC BLOOD PRESSURE: 113 MMHG | HEIGHT: 72 IN | OXYGEN SATURATION: 96 %

## 2020-09-15 DIAGNOSIS — M54.41 CHRONIC BILATERAL LOW BACK PAIN WITH BILATERAL SCIATICA: Primary | ICD-10-CM

## 2020-09-15 DIAGNOSIS — M54.42 CHRONIC BILATERAL LOW BACK PAIN WITH BILATERAL SCIATICA: Primary | ICD-10-CM

## 2020-09-15 DIAGNOSIS — G89.29 CHRONIC BILATERAL LOW BACK PAIN WITH BILATERAL SCIATICA: Primary | ICD-10-CM

## 2020-09-15 PROCEDURE — G0463 HOSPITAL OUTPT CLINIC VISIT: HCPCS

## 2020-09-15 PROCEDURE — 99203 OFFICE O/P NEW LOW 30 MIN: CPT | Performed by: PHYSICIAN ASSISTANT

## 2020-09-15 RX ORDER — EPINEPHRINE 0.3 MG/.3ML
INJECTION SUBCUTANEOUS
COMMUNITY
Start: 2020-06-11

## 2020-09-15 ASSESSMENT — PAIN SCALES - GENERAL: PAINLEVEL: MODERATE PAIN (4)

## 2020-09-15 ASSESSMENT — MIFFLIN-ST. JEOR: SCORE: 1653.18

## 2020-09-15 NOTE — LETTER
9/15/2020         RE: Supa Garcia  [address] [facility] [address]        Dear Colleague,    Thank you for referring your patient, Supa Garcia, to the [facility]. Please see a copy of my visit note below.    Neurosurgery Consult    HPI    Mr. Garcia is a 75-year-old male referred to us for evaluation of low back pain and bilateral posterior leg numbness and tingling.  Patient reports symptoms have been going on for several months.  He reports he tried stem cell/platelet injections with minimal improvement.  He has been doing physical therapy with some improvement.  He reports that when he standing and walking he has no symptoms.  But when he sitting down or lying down he gets tingling in the posterior bilateral thighs running down to his calfs and into his heels.  He denies any bowel or bladder symptoms or saddle anesthesia.    Medical history  Syncope  Recent pacemaker placement    Social history  Retired      B/P: 113/61, T: 97.4, P: 67, R: Data Unavailable       Exam    Alert and oriented no acute distress  Bilateral lower extremities with 5/5 strength  Reflexes 2+ patella/ankle  Negative straight leg raise bilaterally  Negative ankle clonus negative Babinski bilaterally  Lumbar spine nontender to palpation  Able to stand on heels and toes  Gait is normal    Imaging    Lumbar MRI demonstrated on report right L1 and L2 foraminal stenosis  As well as left L4-5 foraminal stenosis.  Imagings were not available for review during today's visit but will be reviewed when available.    Assessment    Low back pain  Bilateral lower extremity tingling    Plan:      I recommend the patient continue working with his physical therapist.  I will further review the images once I can look at them in our system and contact with other recommendation.  If his symptoms are not improving over the next 4 to 6 weeks he should follow-up with us in clinic for further  recommendations.  I discussed that based on his MRI report I did not think that the findings were particularly congruent with his symptoms but I will further review the imaging personally when I am able to.    Total time of 30 minutes spent with the patient today greater than 50% spent face to face in counseling and coordination of care.    Again, thank you for allowing me to participate in the care of your patient.        Sincerely,        Patricia Jewell PA-C

## 2020-09-15 NOTE — PROGRESS NOTES
Neurosurgery Consult    HPI    Mr. Garcia is a 75-year-old male referred to us for evaluation of low back pain and bilateral posterior leg numbness and tingling.  Patient reports symptoms have been going on for several months.  He reports he tried stem cell/platelet injections with minimal improvement.  He has been doing physical therapy with some improvement.  He reports that when he standing and walking he has no symptoms.  But when he sitting down or lying down he gets tingling in the posterior bilateral thighs running down to his calfs and into his heels.  He denies any bowel or bladder symptoms or saddle anesthesia.    Medical history  Syncope  Recent pacemaker placement    Social history  Retired      B/P: 113/61, T: 97.4, P: 67, R: Data Unavailable       Exam    Alert and oriented no acute distress  Bilateral lower extremities with 5/5 strength  Reflexes 2+ patella/ankle  Negative straight leg raise bilaterally  Negative ankle clonus negative Babinski bilaterally  Lumbar spine nontender to palpation  Able to stand on heels and toes  Gait is normal    Imaging    Lumbar MRI demonstrated on report right L1 and L2 foraminal stenosis  As well as left L4-5 foraminal stenosis.  Imagings were not available for review during today's visit but will be reviewed when available.    Assessment    Low back pain  Bilateral lower extremity tingling    Plan:      I recommend the patient continue working with his physical therapist.  I will further review the images once I can look at them in our system and contact with other recommendation.  If his symptoms are not improving over the next 4 to 6 weeks he should follow-up with us in clinic for further recommendations.  I discussed that based on his MRI report I did not think that the findings were particularly congruent with his symptoms but I will further review the imaging personally when I am able to.    Addendum:    Imaging reviewed, no change in plan.     Total time of 30  minutes spent with the patient today greater than 50% spent face to face in counseling and coordination of care.

## 2020-09-15 NOTE — NURSING NOTE
"Supa Garcia is a 75 year old male who presents for:  Chief Complaint   Patient presents with     Neurologic Problem     low back pain, bilateral leg numbness        Initial Vitals:  /61 (BP Location: Right arm, Patient Position: Sitting, Cuff Size: Adult Regular)   Pulse 67   Temp 97.4  F (36.3  C) (Oral)   Ht 5' 11.5\" (1.816 m)   Wt 198 lb (89.8 kg)   SpO2 96%   BMI 27.23 kg/m   Estimated body mass index is 27.23 kg/m  as calculated from the following:    Height as of this encounter: 5' 11.5\" (1.816 m).    Weight as of this encounter: 198 lb (89.8 kg).. Body surface area is 2.13 meters squared. BP completed using cuff size: regular  Moderate Pain (4)    Nursing Comments:see chief complaint    Tino Natarajan, PRABHA    "

## 2020-12-06 ENCOUNTER — HEALTH MAINTENANCE LETTER (OUTPATIENT)
Age: 75
End: 2020-12-06

## 2020-12-08 ENCOUNTER — TELEPHONE (OUTPATIENT)
Dept: FAMILY MEDICINE | Facility: CLINIC | Age: 75
End: 2020-12-08
Payer: MEDICARE

## 2020-12-08 NOTE — TELEPHONE ENCOUNTER
Reason for Call:  Form, our goal is to have forms completed with 72 hours, however, some forms may require a visit or additional information.    Type of letter, form or note:  medical    Who is the form from?: Patient    Where did the form come from: Patient or family brought in       What clinic location was the form placed at?: [facility]    Where the form was placed: place in black bin in the frontm desk.     What number is listed as a contact on the form?: [phone]       Additional comments: Pt wife drop off Health Care Directive per doctor request.     Call taken on 12/8/2020 at 12:12 PM by Florina Pfeiffer

## 2020-12-09 ENCOUNTER — OFFICE VISIT (OUTPATIENT)
Dept: FAMILY MEDICINE | Facility: CLINIC | Age: 75
End: 2020-12-09
Payer: MEDICARE

## 2020-12-09 VITALS
TEMPERATURE: 97.1 F | DIASTOLIC BLOOD PRESSURE: 82 MMHG | HEART RATE: 65 BPM | SYSTOLIC BLOOD PRESSURE: 126 MMHG | HEIGHT: 72 IN | WEIGHT: 193 LBS | OXYGEN SATURATION: 98 % | BODY MASS INDEX: 26.14 KG/M2

## 2020-12-09 DIAGNOSIS — N52.9 ERECTILE DYSFUNCTION, UNSPECIFIED ERECTILE DYSFUNCTION TYPE: ICD-10-CM

## 2020-12-09 DIAGNOSIS — Z12.5 ENCOUNTER FOR SCREENING FOR MALIGNANT NEOPLASM OF PROSTATE: ICD-10-CM

## 2020-12-09 DIAGNOSIS — Z00.00 ENCOUNTER FOR PREVENTIVE HEALTH EXAMINATION: Primary | ICD-10-CM

## 2020-12-09 LAB
BASOPHILS # BLD AUTO: 0 10E9/L (ref 0–0.2)
BASOPHILS NFR BLD AUTO: 0.5 %
CRP SERPL HS-MCNC: 4.7 MG/L
DIFFERENTIAL METHOD BLD: ABNORMAL
EOSINOPHIL # BLD AUTO: 0.1 10E9/L (ref 0–0.7)
EOSINOPHIL NFR BLD AUTO: 2 %
ERYTHROCYTE [DISTWIDTH] IN BLOOD BY AUTOMATED COUNT: 13.3 % (ref 10–15)
ERYTHROCYTE [SEDIMENTATION RATE] IN BLOOD BY WESTERGREN METHOD: 9 MM/H (ref 0–20)
HCT VFR BLD AUTO: 41 % (ref 40–53)
HGB BLD-MCNC: 14 G/DL (ref 13.3–17.7)
LYMPHOCYTES # BLD AUTO: 1.3 10E9/L (ref 0.8–5.3)
LYMPHOCYTES NFR BLD AUTO: 24.5 %
MCH RBC QN AUTO: 33.7 PG (ref 26.5–33)
MCHC RBC AUTO-ENTMCNC: 34.1 G/DL (ref 31.5–36.5)
MCV RBC AUTO: 99 FL (ref 78–100)
MONOCYTES # BLD AUTO: 0.4 10E9/L (ref 0–1.3)
MONOCYTES NFR BLD AUTO: 8 %
NEUTROPHILS # BLD AUTO: 3.6 10E9/L (ref 1.6–8.3)
NEUTROPHILS NFR BLD AUTO: 65 %
PLATELET # BLD AUTO: 177 10E9/L (ref 150–450)
RBC # BLD AUTO: 4.16 10E12/L (ref 4.4–5.9)
WBC # BLD AUTO: 5.5 10E9/L (ref 4–11)

## 2020-12-09 PROCEDURE — G0103 PSA SCREENING: HCPCS | Performed by: INTERNAL MEDICINE

## 2020-12-09 PROCEDURE — 99204 OFFICE O/P NEW MOD 45 MIN: CPT | Performed by: INTERNAL MEDICINE

## 2020-12-09 PROCEDURE — 86141 C-REACTIVE PROTEIN HS: CPT | Performed by: INTERNAL MEDICINE

## 2020-12-09 PROCEDURE — 36415 COLL VENOUS BLD VENIPUNCTURE: CPT | Performed by: INTERNAL MEDICINE

## 2020-12-09 PROCEDURE — 83921 ORGANIC ACID SINGLE QUANT: CPT | Performed by: INTERNAL MEDICINE

## 2020-12-09 PROCEDURE — 80061 LIPID PANEL: CPT | Performed by: INTERNAL MEDICINE

## 2020-12-09 PROCEDURE — 85025 COMPLETE CBC W/AUTO DIFF WBC: CPT | Performed by: INTERNAL MEDICINE

## 2020-12-09 PROCEDURE — 84443 ASSAY THYROID STIM HORMONE: CPT | Performed by: INTERNAL MEDICINE

## 2020-12-09 PROCEDURE — 85652 RBC SED RATE AUTOMATED: CPT | Performed by: INTERNAL MEDICINE

## 2020-12-09 PROCEDURE — 80053 COMPREHEN METABOLIC PANEL: CPT | Performed by: INTERNAL MEDICINE

## 2020-12-09 ASSESSMENT — MIFFLIN-ST. JEOR: SCORE: 1630.5

## 2020-12-09 NOTE — PROGRESS NOTES
"Subjective     Supa Garcia is a 75 year old male who presents to clinic today for the following health issues:    HPI this is a patient presents to clinic to clinic today.  He has a complicated past medical history.  Recent issues have included elevated PSA.  He recently underwent prostate biopsy.  He is unaware of the results of this.    He does have a history of complete heart block.  He does have a pacemaker in place.  He was suffering from syncopal episodes.  He states he is feeling much better from this front.    Continues to have some issues with shoulder pain and chronic low back pain.    He does bring to clinic some records which show elevation of LDL cholesterol and inflammatory markers.  He currently is taking a medication for his testosterone health which is primarily an anti-inflammatory medication with turmeric and other anti-inflammatory herbal medications.    Past Medical History:   Diagnosis Date     Allergic state      Hyperlipidemia LDL goal <130      Syncope      Past Medical History:   Diagnosis Date     Allergic state      Hyperlipidemia LDL goal <130      Syncope               New Patient/Transfer of Care      New Patient/Transfer of Care    Review of Systems   Constitutional, HEENT, cardiovascular, pulmonary, GI, , musculoskeletal, neuro, skin, endocrine and psych systems are negative, except as otherwise noted.      Objective    /82 (BP Location: Right arm, Patient Position: Sitting, Cuff Size: Adult Regular)   Pulse 65   Temp 97.1  F (36.2  C) (Temporal)   Ht 1.816 m (5' 11.5\")   Wt 87.5 kg (193 lb)   SpO2 98%   BMI 26.54 kg/m    Body mass index is 26.54 kg/m .  Physical Exam   GENERAL: healthy, alert and no distress  EYES: Eyes grossly normal to inspection, PERRL and conjunctivae and sclerae normal  HENT: ear canals and TM's normal, nose and mouth without ulcers or lesions  NECK: no adenopathy, no asymmetry, masses, or scars and thyroid normal to palpation  RESP: " lungs clear to auscultation - no rales, rhonchi or wheezes  CV: regular rate and rhythm, normal S1 S2, no S3 or S4, no murmur, click or rub, no peripheral edema and peripheral pulses strong  ABDOMEN: soft, nontender, no hepatosplenomegaly, no masses and bowel sounds normal  MS: no gross musculoskeletal defects noted, no edema  SKIN: no suspicious lesions or rashes  NEURO: Normal strength and tone, mentation intact and speech normal  PSYCH: mentation appears normal, affect normal/bright    Results for orders placed or performed in visit on 12/09/20   Lipid panel reflex to direct LDL Fasting     Status: Abnormal   Result Value Ref Range    Cholesterol 255 (H) <200 mg/dL    Triglycerides 197 (H) <150 mg/dL    HDL Cholesterol 51 >39 mg/dL    LDL Cholesterol Calculated 165 (H) <100 mg/dL    Non HDL Cholesterol 204 (H) <130 mg/dL   CRP cardiac risk     Status: None   Result Value Ref Range    CRP Cardiac Risk 4.7 mg/L   TSH with free T4 reflex     Status: None   Result Value Ref Range    TSH 1.42 0.40 - 4.00 mU/L   CBC with platelets and differential     Status: Abnormal   Result Value Ref Range    WBC 5.5 4.0 - 11.0 10e9/L    RBC Count 4.16 (L) 4.4 - 5.9 10e12/L    Hemoglobin 14.0 13.3 - 17.7 g/dL    Hematocrit 41.0 40.0 - 53.0 %    MCV 99 78 - 100 fl    MCH 33.7 (H) 26.5 - 33.0 pg    MCHC 34.1 31.5 - 36.5 g/dL    RDW 13.3 10.0 - 15.0 %    Platelet Count 177 150 - 450 10e9/L    % Neutrophils 65.0 %    % Lymphocytes 24.5 %    % Monocytes 8.0 %    % Eosinophils 2.0 %    % Basophils 0.5 %    Absolute Neutrophil 3.6 1.6 - 8.3 10e9/L    Absolute Lymphocytes 1.3 0.8 - 5.3 10e9/L    Absolute Monocytes 0.4 0.0 - 1.3 10e9/L    Absolute Eosinophils 0.1 0.0 - 0.7 10e9/L    Absolute Basophils 0.0 0.0 - 0.2 10e9/L    Diff Method Automated Method    Comprehensive metabolic panel (BMP + Alb, Alk Phos, ALT, AST, Total. Bili, TP)     Status: None   Result Value Ref Range    Sodium 140 133 - 144 mmol/L    Potassium 4.4 3.4 - 5.3 mmol/L     Chloride 108 94 - 109 mmol/L    Carbon Dioxide 28 20 - 32 mmol/L    Anion Gap 4 3 - 14 mmol/L    Glucose 97 70 - 99 mg/dL    Urea Nitrogen 25 7 - 30 mg/dL    Creatinine 0.92 0.66 - 1.25 mg/dL    GFR Estimate 80 >60 mL/min/[1.73_m2]    GFR Estimate If Black >90 >60 mL/min/[1.73_m2]    Calcium 9.3 8.5 - 10.1 mg/dL    Bilirubin Total 0.5 0.2 - 1.3 mg/dL    Albumin 4.1 3.4 - 5.0 g/dL    Protein Total 7.5 6.8 - 8.8 g/dL    Alkaline Phosphatase 69 40 - 150 U/L    ALT 33 0 - 70 U/L    AST 24 0 - 45 U/L   PSA, screen     Status: Abnormal   Result Value Ref Range    PSA 22.80 (H) 0 - 4 ug/L   ESR: Erythrocyte sedimentation rate     Status: None   Result Value Ref Range    Sed Rate 9 0 - 20 mm/h           Assessment & Plan     Supa was seen today for establish care and recheck medication.    Diagnoses and all orders for this visit:    Encounter for preventive health examination  -     Lipid panel reflex to direct LDL Fasting  -     CRP cardiac risk  -     TSH with free T4 reflex  -     Methylmalonic Acid  -     CBC with platelets and differential  -     Comprehensive metabolic panel (BMP + Alb, Alk Phos, ALT, AST, Total. Bili, TP)  -     ESR: Erythrocyte sedimentation rate    Erectile dysfunction, unspecified erectile dysfunction type  -     **Testosterone Free and Total FUTURE anytime; Future  -     PSA, screen    Encounter for screening for malignant neoplasm of prostate   -     PSA, screen    PSA may be elevated secondary to recent biopsy nevertheless he requested to be complete.    I would like to evaluate his inflammatory markers today.  He has a elevation in his MCV so methylmalonic acid level will be obtained.    Patient requests evaluation for his testosterone levels.  Obviously treatment of hypogonad is him would be relatively contraindicated given his recent elevation in PSA and prostatic hypertrophy.  Patient is interested in a more holistic approach to treatment.  I will obtain his laboratory study so that he  "can concentrate on the underlying etiologies of possible hypogonadism.     BMI:   Estimated body mass index is 26.54 kg/m  as calculated from the following:    Height as of this encounter: 1.816 m (5' 11.5\").    Weight as of this encounter: 87.5 kg (193 lb).            See Patient Instructions    No follow-ups on file.    Anatoliy Rivers MD  [facility]    "

## 2020-12-09 NOTE — PATIENT INSTRUCTIONS
Supa;    I apologize for getting your visit time confused.  I appreciate your patience in this regard.    You appear to be doing quite well.  Natural remedies for erectile dysfunction include, Panax ginseng, L-arginine, and horny goat weed.  You may wish to start with the more mild approach which would include L-arginine.    I will obtain labs today as we discussed.    Best regards  Anatoliy

## 2020-12-10 PROBLEM — Z91.038 ALLERGY TO INSECT STINGS: Status: ACTIVE | Noted: 2017-06-16

## 2020-12-10 LAB
ALBUMIN SERPL-MCNC: 4.1 G/DL (ref 3.4–5)
ALP SERPL-CCNC: 69 U/L (ref 40–150)
ALT SERPL W P-5'-P-CCNC: 33 U/L (ref 0–70)
ANION GAP SERPL CALCULATED.3IONS-SCNC: 4 MMOL/L (ref 3–14)
AST SERPL W P-5'-P-CCNC: 24 U/L (ref 0–45)
BILIRUB SERPL-MCNC: 0.5 MG/DL (ref 0.2–1.3)
BUN SERPL-MCNC: 25 MG/DL (ref 7–30)
CALCIUM SERPL-MCNC: 9.3 MG/DL (ref 8.5–10.1)
CHLORIDE SERPL-SCNC: 108 MMOL/L (ref 94–109)
CHOLEST SERPL-MCNC: 255 MG/DL
CO2 SERPL-SCNC: 28 MMOL/L (ref 20–32)
CREAT SERPL-MCNC: 0.92 MG/DL (ref 0.66–1.25)
GFR SERPL CREATININE-BSD FRML MDRD: 80 ML/MIN/{1.73_M2}
GLUCOSE SERPL-MCNC: 97 MG/DL (ref 70–99)
HDLC SERPL-MCNC: 51 MG/DL
LDLC SERPL CALC-MCNC: 165 MG/DL
NONHDLC SERPL-MCNC: 204 MG/DL
POTASSIUM SERPL-SCNC: 4.4 MMOL/L (ref 3.4–5.3)
PROT SERPL-MCNC: 7.5 G/DL (ref 6.8–8.8)
PSA SERPL-ACNC: 22.8 UG/L (ref 0–4)
SODIUM SERPL-SCNC: 140 MMOL/L (ref 133–144)
TRIGL SERPL-MCNC: 197 MG/DL
TSH SERPL DL<=0.005 MIU/L-ACNC: 1.42 MU/L (ref 0.4–4)

## 2020-12-23 LAB — METHYLMALONATE SERPL-SCNC: 0.26 UMOL/L (ref 0–0.4)

## 2021-01-15 ENCOUNTER — DOCUMENTATION ONLY (OUTPATIENT)
Dept: OTHER | Facility: CLINIC | Age: 76
End: 2021-01-15

## 2021-01-18 ENCOUNTER — RECORDS - HEALTHEAST (OUTPATIENT)
Dept: SCHEDULING | Facility: CLINIC | Age: 76
End: 2021-01-18

## 2021-01-18 ENCOUNTER — RECORDS - HEALTHEAST (OUTPATIENT)
Dept: ADMINISTRATIVE | Facility: OTHER | Age: 76
End: 2021-01-18

## 2021-01-18 DIAGNOSIS — C61 MALIGNANT NEOPLASM OF PROSTATE (H): ICD-10-CM

## 2021-01-22 ENCOUNTER — AMBULATORY - HEALTHEAST (OUTPATIENT)
Dept: CARDIOLOGY | Facility: CLINIC | Age: 76
End: 2021-01-22

## 2021-01-28 ENCOUNTER — RECORDS - HEALTHEAST (OUTPATIENT)
Dept: ADMINISTRATIVE | Facility: OTHER | Age: 76
End: 2021-01-28

## 2021-01-29 ENCOUNTER — ANCILLARY PROCEDURE (OUTPATIENT)
Dept: CARDIOLOGY | Facility: CLINIC | Age: 76
End: 2021-01-29
Attending: NURSE PRACTITIONER
Payer: MEDICARE

## 2021-01-29 ENCOUNTER — HOSPITAL ENCOUNTER (OUTPATIENT)
Dept: GENERAL RADIOLOGY | Facility: CLINIC | Age: 76
End: 2021-01-29
Attending: UROLOGY
Payer: MEDICARE

## 2021-01-29 ENCOUNTER — AMBULATORY - HEALTHEAST (OUTPATIENT)
Dept: SURGERY | Facility: HOSPITAL | Age: 76
End: 2021-01-29

## 2021-01-29 ENCOUNTER — HOSPITAL ENCOUNTER (OUTPATIENT)
Dept: MRI IMAGING | Facility: CLINIC | Age: 76
End: 2021-01-29
Attending: UROLOGY
Payer: MEDICARE

## 2021-01-29 DIAGNOSIS — C61 MALIGNANT NEOPLASM OF PROSTATE (H): ICD-10-CM

## 2021-01-29 DIAGNOSIS — Z11.59 ENCOUNTER FOR SCREENING FOR OTHER VIRAL DISEASES: ICD-10-CM

## 2021-01-29 DIAGNOSIS — Z95.0 PACEMAKER: ICD-10-CM

## 2021-01-29 LAB
CREAT BLD-MCNC: 1 MG/DL (ref 0.66–1.25)
GFR SERPL CREATININE-BSD FRML MDRD: 72 ML/MIN/{1.73_M2}

## 2021-01-29 PROCEDURE — 93286 PERI-PX EVAL PM/LDLS PM IP: CPT | Mod: 26 | Performed by: INTERNAL MEDICINE

## 2021-01-29 PROCEDURE — 93286 PERI-PX EVAL PM/LDLS PM IP: CPT

## 2021-01-29 PROCEDURE — 71046 X-RAY EXAM CHEST 2 VIEWS: CPT | Mod: 26 | Performed by: RADIOLOGY

## 2021-01-29 PROCEDURE — 82565 ASSAY OF CREATININE: CPT

## 2021-01-29 PROCEDURE — 72197 MRI PELVIS W/O & W/DYE: CPT

## 2021-01-29 PROCEDURE — A9585 GADOBUTROL INJECTION: HCPCS

## 2021-01-29 PROCEDURE — 72197 MRI PELVIS W/O & W/DYE: CPT | Mod: 26 | Performed by: RADIOLOGY

## 2021-01-29 PROCEDURE — 71046 X-RAY EXAM CHEST 2 VIEWS: CPT

## 2021-01-29 PROCEDURE — 255N000002 HC RX 255 OP 636

## 2021-01-29 RX ORDER — GADOBUTROL 604.72 MG/ML
10 INJECTION INTRAVENOUS ONCE
Status: COMPLETED | OUTPATIENT
Start: 2021-01-29 | End: 2021-01-29

## 2021-01-29 RX ADMIN — GADOBUTROL 8 ML: 604.72 INJECTION INTRAVENOUS at 10:38

## 2021-02-08 LAB
MDC_IDC_EPISODE_DTM: NORMAL
MDC_IDC_EPISODE_DURATION: 0 S
MDC_IDC_EPISODE_DURATION: 1 S
MDC_IDC_EPISODE_DURATION: 2 S
MDC_IDC_EPISODE_ID: 4
MDC_IDC_EPISODE_ID: 5
MDC_IDC_EPISODE_ID: 6
MDC_IDC_EPISODE_ID: 7
MDC_IDC_EPISODE_ID: 8
MDC_IDC_EPISODE_TYPE: NORMAL
MDC_IDC_LEAD_IMPLANT_DT: NORMAL
MDC_IDC_LEAD_LOCATION: NORMAL
MDC_IDC_LEAD_LOCATION_DETAIL_1: NORMAL
MDC_IDC_LEAD_MFG: NORMAL
MDC_IDC_LEAD_MODEL: NORMAL
MDC_IDC_LEAD_POLARITY_TYPE: NORMAL
MDC_IDC_LEAD_SERIAL: NORMAL
MDC_IDC_LEAD_SPECIAL_FUNCTION: NORMAL
MDC_IDC_MSMT_BATTERY_DTM: NORMAL
MDC_IDC_MSMT_BATTERY_DTM: NORMAL
MDC_IDC_MSMT_BATTERY_REMAINING_LONGEVITY: 160 MO
MDC_IDC_MSMT_BATTERY_REMAINING_LONGEVITY: 160 MO
MDC_IDC_MSMT_BATTERY_RRT_TRIGGER: 2.62
MDC_IDC_MSMT_BATTERY_RRT_TRIGGER: 2.62
MDC_IDC_MSMT_BATTERY_STATUS: NORMAL
MDC_IDC_MSMT_BATTERY_STATUS: NORMAL
MDC_IDC_MSMT_BATTERY_VOLTAGE: 3.06 V
MDC_IDC_MSMT_BATTERY_VOLTAGE: 3.06 V
MDC_IDC_MSMT_LEADCHNL_RA_IMPEDANCE_VALUE: 342 OHM
MDC_IDC_MSMT_LEADCHNL_RA_IMPEDANCE_VALUE: 342 OHM
MDC_IDC_MSMT_LEADCHNL_RA_IMPEDANCE_VALUE: 475 OHM
MDC_IDC_MSMT_LEADCHNL_RA_IMPEDANCE_VALUE: 475 OHM
MDC_IDC_MSMT_LEADCHNL_RA_PACING_THRESHOLD_AMPLITUDE: 0.5 V
MDC_IDC_MSMT_LEADCHNL_RA_PACING_THRESHOLD_AMPLITUDE: 0.75 V
MDC_IDC_MSMT_LEADCHNL_RA_PACING_THRESHOLD_PULSEWIDTH: 0.4 MS
MDC_IDC_MSMT_LEADCHNL_RA_SENSING_INTR_AMPL: 2.25 MV
MDC_IDC_MSMT_LEADCHNL_RA_SENSING_INTR_AMPL: 2.25 MV
MDC_IDC_MSMT_LEADCHNL_RA_SENSING_INTR_AMPL: 2.38 MV
MDC_IDC_MSMT_LEADCHNL_RA_SENSING_INTR_AMPL: 2.38 MV
MDC_IDC_MSMT_LEADCHNL_RV_IMPEDANCE_VALUE: 437 OHM
MDC_IDC_MSMT_LEADCHNL_RV_IMPEDANCE_VALUE: 437 OHM
MDC_IDC_MSMT_LEADCHNL_RV_IMPEDANCE_VALUE: 513 OHM
MDC_IDC_MSMT_LEADCHNL_RV_IMPEDANCE_VALUE: 513 OHM
MDC_IDC_MSMT_LEADCHNL_RV_PACING_THRESHOLD_AMPLITUDE: 0.75 V
MDC_IDC_MSMT_LEADCHNL_RV_PACING_THRESHOLD_PULSEWIDTH: 0.4 MS
MDC_IDC_MSMT_LEADCHNL_RV_SENSING_INTR_AMPL: 11.5 MV
MDC_IDC_MSMT_LEADCHNL_RV_SENSING_INTR_AMPL: 11.5 MV
MDC_IDC_MSMT_LEADCHNL_RV_SENSING_INTR_AMPL: 12.12 MV
MDC_IDC_MSMT_LEADCHNL_RV_SENSING_INTR_AMPL: 12.12 MV
MDC_IDC_PG_IMPLANT_DTM: NORMAL
MDC_IDC_PG_IMPLANT_DTM: NORMAL
MDC_IDC_PG_MFG: NORMAL
MDC_IDC_PG_MFG: NORMAL
MDC_IDC_PG_MODEL: NORMAL
MDC_IDC_PG_MODEL: NORMAL
MDC_IDC_PG_SERIAL: NORMAL
MDC_IDC_PG_SERIAL: NORMAL
MDC_IDC_PG_TYPE: NORMAL
MDC_IDC_PG_TYPE: NORMAL
MDC_IDC_SESS_CLINIC_NAME: NORMAL
MDC_IDC_SESS_CLINIC_NAME: NORMAL
MDC_IDC_SESS_DTM: NORMAL
MDC_IDC_SESS_DTM: NORMAL
MDC_IDC_SESS_TYPE: NORMAL
MDC_IDC_SESS_TYPE: NORMAL
MDC_IDC_SET_BRADY_AT_MODE_SWITCH_RATE: 171 {BEATS}/MIN
MDC_IDC_SET_BRADY_AT_MODE_SWITCH_RATE: 171 {BEATS}/MIN
MDC_IDC_SET_BRADY_HYSTRATE: NORMAL
MDC_IDC_SET_BRADY_HYSTRATE: NORMAL
MDC_IDC_SET_BRADY_LOWRATE: 60 {BEATS}/MIN
MDC_IDC_SET_BRADY_LOWRATE: 60 {BEATS}/MIN
MDC_IDC_SET_BRADY_MAX_SENSOR_RATE: 130 {BEATS}/MIN
MDC_IDC_SET_BRADY_MAX_SENSOR_RATE: 130 {BEATS}/MIN
MDC_IDC_SET_BRADY_MAX_TRACKING_RATE: 130 {BEATS}/MIN
MDC_IDC_SET_BRADY_MAX_TRACKING_RATE: 130 {BEATS}/MIN
MDC_IDC_SET_BRADY_MODE: NORMAL
MDC_IDC_SET_BRADY_MODE: NORMAL
MDC_IDC_SET_BRADY_PAV_DELAY_LOW: 180 MS
MDC_IDC_SET_BRADY_PAV_DELAY_LOW: 180 MS
MDC_IDC_SET_BRADY_SAV_DELAY_LOW: 150 MS
MDC_IDC_SET_BRADY_SAV_DELAY_LOW: 150 MS
MDC_IDC_SET_LEADCHNL_RA_PACING_AMPLITUDE: 1.5 V
MDC_IDC_SET_LEADCHNL_RA_PACING_AMPLITUDE: 1.5 V
MDC_IDC_SET_LEADCHNL_RA_PACING_ANODE_ELECTRODE_1: NORMAL
MDC_IDC_SET_LEADCHNL_RA_PACING_ANODE_ELECTRODE_1: NORMAL
MDC_IDC_SET_LEADCHNL_RA_PACING_ANODE_LOCATION_1: NORMAL
MDC_IDC_SET_LEADCHNL_RA_PACING_ANODE_LOCATION_1: NORMAL
MDC_IDC_SET_LEADCHNL_RA_PACING_CAPTURE_MODE: NORMAL
MDC_IDC_SET_LEADCHNL_RA_PACING_CAPTURE_MODE: NORMAL
MDC_IDC_SET_LEADCHNL_RA_PACING_CATHODE_ELECTRODE_1: NORMAL
MDC_IDC_SET_LEADCHNL_RA_PACING_CATHODE_ELECTRODE_1: NORMAL
MDC_IDC_SET_LEADCHNL_RA_PACING_CATHODE_LOCATION_1: NORMAL
MDC_IDC_SET_LEADCHNL_RA_PACING_CATHODE_LOCATION_1: NORMAL
MDC_IDC_SET_LEADCHNL_RA_PACING_POLARITY: NORMAL
MDC_IDC_SET_LEADCHNL_RA_PACING_POLARITY: NORMAL
MDC_IDC_SET_LEADCHNL_RA_PACING_PULSEWIDTH: 0.4 MS
MDC_IDC_SET_LEADCHNL_RA_PACING_PULSEWIDTH: 0.4 MS
MDC_IDC_SET_LEADCHNL_RA_SENSING_ANODE_ELECTRODE_1: NORMAL
MDC_IDC_SET_LEADCHNL_RA_SENSING_ANODE_ELECTRODE_1: NORMAL
MDC_IDC_SET_LEADCHNL_RA_SENSING_ANODE_LOCATION_1: NORMAL
MDC_IDC_SET_LEADCHNL_RA_SENSING_ANODE_LOCATION_1: NORMAL
MDC_IDC_SET_LEADCHNL_RA_SENSING_CATHODE_ELECTRODE_1: NORMAL
MDC_IDC_SET_LEADCHNL_RA_SENSING_CATHODE_ELECTRODE_1: NORMAL
MDC_IDC_SET_LEADCHNL_RA_SENSING_CATHODE_LOCATION_1: NORMAL
MDC_IDC_SET_LEADCHNL_RA_SENSING_CATHODE_LOCATION_1: NORMAL
MDC_IDC_SET_LEADCHNL_RA_SENSING_POLARITY: NORMAL
MDC_IDC_SET_LEADCHNL_RA_SENSING_POLARITY: NORMAL
MDC_IDC_SET_LEADCHNL_RA_SENSING_SENSITIVITY: 0.3 MV
MDC_IDC_SET_LEADCHNL_RA_SENSING_SENSITIVITY: 0.3 MV
MDC_IDC_SET_LEADCHNL_RV_PACING_AMPLITUDE: 1.5 V
MDC_IDC_SET_LEADCHNL_RV_PACING_AMPLITUDE: 1.5 V
MDC_IDC_SET_LEADCHNL_RV_PACING_ANODE_ELECTRODE_1: NORMAL
MDC_IDC_SET_LEADCHNL_RV_PACING_ANODE_ELECTRODE_1: NORMAL
MDC_IDC_SET_LEADCHNL_RV_PACING_ANODE_LOCATION_1: NORMAL
MDC_IDC_SET_LEADCHNL_RV_PACING_ANODE_LOCATION_1: NORMAL
MDC_IDC_SET_LEADCHNL_RV_PACING_CAPTURE_MODE: NORMAL
MDC_IDC_SET_LEADCHNL_RV_PACING_CAPTURE_MODE: NORMAL
MDC_IDC_SET_LEADCHNL_RV_PACING_CATHODE_ELECTRODE_1: NORMAL
MDC_IDC_SET_LEADCHNL_RV_PACING_CATHODE_ELECTRODE_1: NORMAL
MDC_IDC_SET_LEADCHNL_RV_PACING_CATHODE_LOCATION_1: NORMAL
MDC_IDC_SET_LEADCHNL_RV_PACING_CATHODE_LOCATION_1: NORMAL
MDC_IDC_SET_LEADCHNL_RV_PACING_POLARITY: NORMAL
MDC_IDC_SET_LEADCHNL_RV_PACING_POLARITY: NORMAL
MDC_IDC_SET_LEADCHNL_RV_PACING_PULSEWIDTH: 0.4 MS
MDC_IDC_SET_LEADCHNL_RV_PACING_PULSEWIDTH: 0.4 MS
MDC_IDC_SET_LEADCHNL_RV_SENSING_ANODE_ELECTRODE_1: NORMAL
MDC_IDC_SET_LEADCHNL_RV_SENSING_ANODE_ELECTRODE_1: NORMAL
MDC_IDC_SET_LEADCHNL_RV_SENSING_ANODE_LOCATION_1: NORMAL
MDC_IDC_SET_LEADCHNL_RV_SENSING_ANODE_LOCATION_1: NORMAL
MDC_IDC_SET_LEADCHNL_RV_SENSING_CATHODE_ELECTRODE_1: NORMAL
MDC_IDC_SET_LEADCHNL_RV_SENSING_CATHODE_ELECTRODE_1: NORMAL
MDC_IDC_SET_LEADCHNL_RV_SENSING_CATHODE_LOCATION_1: NORMAL
MDC_IDC_SET_LEADCHNL_RV_SENSING_CATHODE_LOCATION_1: NORMAL
MDC_IDC_SET_LEADCHNL_RV_SENSING_POLARITY: NORMAL
MDC_IDC_SET_LEADCHNL_RV_SENSING_POLARITY: NORMAL
MDC_IDC_SET_LEADCHNL_RV_SENSING_SENSITIVITY: 0.9 MV
MDC_IDC_SET_LEADCHNL_RV_SENSING_SENSITIVITY: 0.9 MV
MDC_IDC_SET_ZONE_DETECTION_INTERVAL: 350 MS
MDC_IDC_SET_ZONE_DETECTION_INTERVAL: 350 MS
MDC_IDC_SET_ZONE_DETECTION_INTERVAL: 400 MS
MDC_IDC_SET_ZONE_DETECTION_INTERVAL: 400 MS
MDC_IDC_SET_ZONE_TYPE: NORMAL
MDC_IDC_STAT_AT_BURDEN_PERCENT: 0 %
MDC_IDC_STAT_AT_BURDEN_PERCENT: 0 %
MDC_IDC_STAT_AT_DTM_END: NORMAL
MDC_IDC_STAT_AT_DTM_END: NORMAL
MDC_IDC_STAT_AT_DTM_START: NORMAL
MDC_IDC_STAT_AT_DTM_START: NORMAL
MDC_IDC_STAT_BRADY_AP_VP_PERCENT: 0.05 %
MDC_IDC_STAT_BRADY_AP_VP_PERCENT: 0.05 %
MDC_IDC_STAT_BRADY_AP_VS_PERCENT: 64 %
MDC_IDC_STAT_BRADY_AP_VS_PERCENT: 64 %
MDC_IDC_STAT_BRADY_AS_VP_PERCENT: 0.03 %
MDC_IDC_STAT_BRADY_AS_VP_PERCENT: 0.03 %
MDC_IDC_STAT_BRADY_AS_VS_PERCENT: 35.92 %
MDC_IDC_STAT_BRADY_AS_VS_PERCENT: 35.92 %
MDC_IDC_STAT_BRADY_DTM_END: NORMAL
MDC_IDC_STAT_BRADY_DTM_END: NORMAL
MDC_IDC_STAT_BRADY_DTM_START: NORMAL
MDC_IDC_STAT_BRADY_DTM_START: NORMAL
MDC_IDC_STAT_BRADY_RA_PERCENT_PACED: 64.2 %
MDC_IDC_STAT_BRADY_RA_PERCENT_PACED: 64.2 %
MDC_IDC_STAT_BRADY_RV_PERCENT_PACED: 0.08 %
MDC_IDC_STAT_BRADY_RV_PERCENT_PACED: 0.08 %
MDC_IDC_STAT_EPISODE_RECENT_COUNT: 0
MDC_IDC_STAT_EPISODE_RECENT_COUNT: 5
MDC_IDC_STAT_EPISODE_RECENT_COUNT: 5
MDC_IDC_STAT_EPISODE_RECENT_COUNT_DTM_END: NORMAL
MDC_IDC_STAT_EPISODE_RECENT_COUNT_DTM_START: NORMAL
MDC_IDC_STAT_EPISODE_TOTAL_COUNT: 0
MDC_IDC_STAT_EPISODE_TOTAL_COUNT: 8
MDC_IDC_STAT_EPISODE_TOTAL_COUNT: 8
MDC_IDC_STAT_EPISODE_TOTAL_COUNT_DTM_END: NORMAL
MDC_IDC_STAT_EPISODE_TOTAL_COUNT_DTM_START: NORMAL
MDC_IDC_STAT_EPISODE_TYPE: NORMAL

## 2021-02-09 ENCOUNTER — RECORDS - HEALTHEAST (OUTPATIENT)
Dept: ADMINISTRATIVE | Facility: OTHER | Age: 76
End: 2021-02-09

## 2021-02-09 DIAGNOSIS — Z11.59 ENCOUNTER FOR SCREENING FOR OTHER VIRAL DISEASES: Primary | ICD-10-CM

## 2021-02-13 DIAGNOSIS — Z11.59 ENCOUNTER FOR SCREENING FOR OTHER VIRAL DISEASES: ICD-10-CM

## 2021-02-13 LAB
SARS-COV-2 RNA RESP QL NAA+PROBE: NORMAL
SPECIMEN SOURCE: NORMAL

## 2021-02-13 PROCEDURE — U0003 INFECTIOUS AGENT DETECTION BY NUCLEIC ACID (DNA OR RNA); SEVERE ACUTE RESPIRATORY SYNDROME CORONAVIRUS 2 (SARS-COV-2) (CORONAVIRUS DISEASE [COVID-19]), AMPLIFIED PROBE TECHNIQUE, MAKING USE OF HIGH THROUGHPUT TECHNOLOGIES AS DESCRIBED BY CMS-2020-01-R: HCPCS | Performed by: UROLOGY

## 2021-02-13 PROCEDURE — U0005 INFEC AGEN DETEC AMPLI PROBE: HCPCS | Performed by: UROLOGY

## 2021-02-14 LAB
LABORATORY COMMENT REPORT: NORMAL
SARS-COV-2 RNA RESP QL NAA+PROBE: NEGATIVE
SPECIMEN SOURCE: NORMAL

## 2021-04-06 ENCOUNTER — ANCILLARY PROCEDURE (OUTPATIENT)
Dept: CARDIOLOGY | Facility: CLINIC | Age: 76
End: 2021-04-06
Attending: INTERNAL MEDICINE
Payer: MEDICARE

## 2021-04-06 ENCOUNTER — HOSPITAL ENCOUNTER (OUTPATIENT)
Dept: MRI IMAGING | Facility: CLINIC | Age: 76
End: 2021-04-06
Attending: INTERNAL MEDICINE
Payer: MEDICARE

## 2021-04-06 DIAGNOSIS — Z45.02 ENCOUNTER FOR ADJUSTMENT AND MANAGEMENT OF AUTOMATIC IMPLANTABLE CARDIAC DEFIBRILLATOR: ICD-10-CM

## 2021-04-06 DIAGNOSIS — I44.2 COMPLETE HEART BLOCK (H): Primary | ICD-10-CM

## 2021-04-06 DIAGNOSIS — I44.2 COMPLETE HEART BLOCK (H): ICD-10-CM

## 2021-04-06 DIAGNOSIS — C61 PROSTATE CANCER (H): ICD-10-CM

## 2021-04-06 LAB
CREAT BLD-MCNC: 0.9 MG/DL (ref 0.66–1.25)
GFR SERPL CREATININE-BSD FRML MDRD: 82 ML/MIN/{1.73_M2}

## 2021-04-06 PROCEDURE — 93286 PERI-PX EVAL PM/LDLS PM IP: CPT

## 2021-04-06 PROCEDURE — A9585 GADOBUTROL INJECTION: HCPCS | Performed by: INTERNAL MEDICINE

## 2021-04-06 PROCEDURE — 82565 ASSAY OF CREATININE: CPT

## 2021-04-06 PROCEDURE — 72197 MRI PELVIS W/O & W/DYE: CPT | Mod: 26 | Performed by: RADIOLOGY

## 2021-04-06 PROCEDURE — 255N000002 HC RX 255 OP 636: Performed by: INTERNAL MEDICINE

## 2021-04-06 PROCEDURE — 72197 MRI PELVIS W/O & W/DYE: CPT

## 2021-04-06 PROCEDURE — 93286 PERI-PX EVAL PM/LDLS PM IP: CPT | Mod: 26 | Performed by: INTERNAL MEDICINE

## 2021-04-06 RX ORDER — GADOBUTROL 604.72 MG/ML
10 INJECTION INTRAVENOUS ONCE
Status: DISCONTINUED | OUTPATIENT
Start: 2021-04-06 | End: 2021-04-06

## 2021-04-06 RX ORDER — GADOBUTROL 604.72 MG/ML
10 INJECTION INTRAVENOUS ONCE
Status: COMPLETED | OUTPATIENT
Start: 2021-04-06 | End: 2021-04-06

## 2021-04-06 RX ADMIN — GADOBUTROL 8 ML: 604.72 INJECTION INTRAVENOUS at 10:47

## 2021-04-07 LAB
MDC_IDC_LEAD_IMPLANT_DT: NORMAL
MDC_IDC_LEAD_LOCATION: NORMAL
MDC_IDC_LEAD_LOCATION_DETAIL_1: NORMAL
MDC_IDC_LEAD_MFG: NORMAL
MDC_IDC_LEAD_MODEL: NORMAL
MDC_IDC_LEAD_POLARITY_TYPE: NORMAL
MDC_IDC_LEAD_SERIAL: NORMAL
MDC_IDC_LEAD_SPECIAL_FUNCTION: NORMAL
MDC_IDC_MSMT_BATTERY_DTM: NORMAL
MDC_IDC_MSMT_BATTERY_DTM: NORMAL
MDC_IDC_MSMT_BATTERY_REMAINING_LONGEVITY: 156 MO
MDC_IDC_MSMT_BATTERY_REMAINING_LONGEVITY: 156 MO
MDC_IDC_MSMT_BATTERY_RRT_TRIGGER: 2.62
MDC_IDC_MSMT_BATTERY_RRT_TRIGGER: 2.62
MDC_IDC_MSMT_BATTERY_STATUS: NORMAL
MDC_IDC_MSMT_BATTERY_STATUS: NORMAL
MDC_IDC_MSMT_BATTERY_VOLTAGE: 3.04 V
MDC_IDC_MSMT_BATTERY_VOLTAGE: 3.04 V
MDC_IDC_MSMT_LEADCHNL_RA_IMPEDANCE_VALUE: 323 OHM
MDC_IDC_MSMT_LEADCHNL_RA_IMPEDANCE_VALUE: 342 OHM
MDC_IDC_MSMT_LEADCHNL_RA_IMPEDANCE_VALUE: 437 OHM
MDC_IDC_MSMT_LEADCHNL_RA_PACING_THRESHOLD_AMPLITUDE: 0.5 V
MDC_IDC_MSMT_LEADCHNL_RA_PACING_THRESHOLD_AMPLITUDE: 0.75 V
MDC_IDC_MSMT_LEADCHNL_RA_PACING_THRESHOLD_PULSEWIDTH: 0.4 MS
MDC_IDC_MSMT_LEADCHNL_RA_PACING_THRESHOLD_PULSEWIDTH: 0.4 MS
MDC_IDC_MSMT_LEADCHNL_RA_SENSING_INTR_AMPL: 1.88 MV
MDC_IDC_MSMT_LEADCHNL_RA_SENSING_INTR_AMPL: 1.88 MV
MDC_IDC_MSMT_LEADCHNL_RA_SENSING_INTR_AMPL: 2.12 MV
MDC_IDC_MSMT_LEADCHNL_RA_SENSING_INTR_AMPL: 2.9 MV
MDC_IDC_MSMT_LEADCHNL_RV_IMPEDANCE_VALUE: 418 OHM
MDC_IDC_MSMT_LEADCHNL_RV_IMPEDANCE_VALUE: 475 OHM
MDC_IDC_MSMT_LEADCHNL_RV_IMPEDANCE_VALUE: 475 OHM
MDC_IDC_MSMT_LEADCHNL_RV_PACING_THRESHOLD_AMPLITUDE: 0.75 V
MDC_IDC_MSMT_LEADCHNL_RV_PACING_THRESHOLD_AMPLITUDE: 0.75 V
MDC_IDC_MSMT_LEADCHNL_RV_PACING_THRESHOLD_PULSEWIDTH: 0.4 MS
MDC_IDC_MSMT_LEADCHNL_RV_PACING_THRESHOLD_PULSEWIDTH: 0.4 MS
MDC_IDC_MSMT_LEADCHNL_RV_SENSING_INTR_AMPL: 12.38 MV
MDC_IDC_MSMT_LEADCHNL_RV_SENSING_INTR_AMPL: 12.38 MV
MDC_IDC_MSMT_LEADCHNL_RV_SENSING_INTR_AMPL: 13.8 MV
MDC_IDC_MSMT_LEADCHNL_RV_SENSING_INTR_AMPL: 14 MV
MDC_IDC_PG_IMPLANT_DTM: NORMAL
MDC_IDC_PG_IMPLANT_DTM: NORMAL
MDC_IDC_PG_MFG: NORMAL
MDC_IDC_PG_MFG: NORMAL
MDC_IDC_PG_MODEL: NORMAL
MDC_IDC_PG_MODEL: NORMAL
MDC_IDC_PG_SERIAL: NORMAL
MDC_IDC_PG_SERIAL: NORMAL
MDC_IDC_PG_TYPE: NORMAL
MDC_IDC_PG_TYPE: NORMAL
MDC_IDC_SESS_CLINIC_NAME: NORMAL
MDC_IDC_SESS_CLINIC_NAME: NORMAL
MDC_IDC_SESS_DTM: NORMAL
MDC_IDC_SESS_DTM: NORMAL
MDC_IDC_SESS_TYPE: NORMAL
MDC_IDC_SESS_TYPE: NORMAL
MDC_IDC_SET_BRADY_AT_MODE_SWITCH_RATE: 171 {BEATS}/MIN
MDC_IDC_SET_BRADY_AT_MODE_SWITCH_RATE: 171 {BEATS}/MIN
MDC_IDC_SET_BRADY_HYSTRATE: NORMAL
MDC_IDC_SET_BRADY_HYSTRATE: NORMAL
MDC_IDC_SET_BRADY_LOWRATE: 60 {BEATS}/MIN
MDC_IDC_SET_BRADY_LOWRATE: 60 {BEATS}/MIN
MDC_IDC_SET_BRADY_MAX_SENSOR_RATE: 130 {BEATS}/MIN
MDC_IDC_SET_BRADY_MAX_SENSOR_RATE: 130 {BEATS}/MIN
MDC_IDC_SET_BRADY_MAX_TRACKING_RATE: 130 {BEATS}/MIN
MDC_IDC_SET_BRADY_MAX_TRACKING_RATE: 130 {BEATS}/MIN
MDC_IDC_SET_BRADY_MODE: NORMAL
MDC_IDC_SET_BRADY_MODE: NORMAL
MDC_IDC_SET_BRADY_PAV_DELAY_LOW: 180 MS
MDC_IDC_SET_BRADY_PAV_DELAY_LOW: 180 MS
MDC_IDC_SET_BRADY_SAV_DELAY_LOW: 150 MS
MDC_IDC_SET_BRADY_SAV_DELAY_LOW: 150 MS
MDC_IDC_SET_LEADCHNL_RA_PACING_AMPLITUDE: 1.5 V
MDC_IDC_SET_LEADCHNL_RA_PACING_AMPLITUDE: 1.5 V
MDC_IDC_SET_LEADCHNL_RA_PACING_ANODE_ELECTRODE_1: NORMAL
MDC_IDC_SET_LEADCHNL_RA_PACING_ANODE_ELECTRODE_1: NORMAL
MDC_IDC_SET_LEADCHNL_RA_PACING_ANODE_LOCATION_1: NORMAL
MDC_IDC_SET_LEADCHNL_RA_PACING_ANODE_LOCATION_1: NORMAL
MDC_IDC_SET_LEADCHNL_RA_PACING_CAPTURE_MODE: NORMAL
MDC_IDC_SET_LEADCHNL_RA_PACING_CAPTURE_MODE: NORMAL
MDC_IDC_SET_LEADCHNL_RA_PACING_CATHODE_ELECTRODE_1: NORMAL
MDC_IDC_SET_LEADCHNL_RA_PACING_CATHODE_ELECTRODE_1: NORMAL
MDC_IDC_SET_LEADCHNL_RA_PACING_CATHODE_LOCATION_1: NORMAL
MDC_IDC_SET_LEADCHNL_RA_PACING_CATHODE_LOCATION_1: NORMAL
MDC_IDC_SET_LEADCHNL_RA_PACING_POLARITY: NORMAL
MDC_IDC_SET_LEADCHNL_RA_PACING_POLARITY: NORMAL
MDC_IDC_SET_LEADCHNL_RA_PACING_PULSEWIDTH: 0.4 MS
MDC_IDC_SET_LEADCHNL_RA_PACING_PULSEWIDTH: 0.4 MS
MDC_IDC_SET_LEADCHNL_RA_SENSING_ANODE_ELECTRODE_1: NORMAL
MDC_IDC_SET_LEADCHNL_RA_SENSING_ANODE_ELECTRODE_1: NORMAL
MDC_IDC_SET_LEADCHNL_RA_SENSING_ANODE_LOCATION_1: NORMAL
MDC_IDC_SET_LEADCHNL_RA_SENSING_ANODE_LOCATION_1: NORMAL
MDC_IDC_SET_LEADCHNL_RA_SENSING_CATHODE_ELECTRODE_1: NORMAL
MDC_IDC_SET_LEADCHNL_RA_SENSING_CATHODE_ELECTRODE_1: NORMAL
MDC_IDC_SET_LEADCHNL_RA_SENSING_CATHODE_LOCATION_1: NORMAL
MDC_IDC_SET_LEADCHNL_RA_SENSING_CATHODE_LOCATION_1: NORMAL
MDC_IDC_SET_LEADCHNL_RA_SENSING_POLARITY: NORMAL
MDC_IDC_SET_LEADCHNL_RA_SENSING_POLARITY: NORMAL
MDC_IDC_SET_LEADCHNL_RA_SENSING_SENSITIVITY: 0.3 MV
MDC_IDC_SET_LEADCHNL_RA_SENSING_SENSITIVITY: 0.3 MV
MDC_IDC_SET_LEADCHNL_RV_PACING_AMPLITUDE: 1.75 V
MDC_IDC_SET_LEADCHNL_RV_PACING_AMPLITUDE: 1.75 V
MDC_IDC_SET_LEADCHNL_RV_PACING_ANODE_ELECTRODE_1: NORMAL
MDC_IDC_SET_LEADCHNL_RV_PACING_ANODE_ELECTRODE_1: NORMAL
MDC_IDC_SET_LEADCHNL_RV_PACING_ANODE_LOCATION_1: NORMAL
MDC_IDC_SET_LEADCHNL_RV_PACING_ANODE_LOCATION_1: NORMAL
MDC_IDC_SET_LEADCHNL_RV_PACING_CAPTURE_MODE: NORMAL
MDC_IDC_SET_LEADCHNL_RV_PACING_CAPTURE_MODE: NORMAL
MDC_IDC_SET_LEADCHNL_RV_PACING_CATHODE_ELECTRODE_1: NORMAL
MDC_IDC_SET_LEADCHNL_RV_PACING_CATHODE_ELECTRODE_1: NORMAL
MDC_IDC_SET_LEADCHNL_RV_PACING_CATHODE_LOCATION_1: NORMAL
MDC_IDC_SET_LEADCHNL_RV_PACING_CATHODE_LOCATION_1: NORMAL
MDC_IDC_SET_LEADCHNL_RV_PACING_POLARITY: NORMAL
MDC_IDC_SET_LEADCHNL_RV_PACING_POLARITY: NORMAL
MDC_IDC_SET_LEADCHNL_RV_PACING_PULSEWIDTH: 0.4 MS
MDC_IDC_SET_LEADCHNL_RV_PACING_PULSEWIDTH: 0.4 MS
MDC_IDC_SET_LEADCHNL_RV_SENSING_ANODE_ELECTRODE_1: NORMAL
MDC_IDC_SET_LEADCHNL_RV_SENSING_ANODE_ELECTRODE_1: NORMAL
MDC_IDC_SET_LEADCHNL_RV_SENSING_ANODE_LOCATION_1: NORMAL
MDC_IDC_SET_LEADCHNL_RV_SENSING_ANODE_LOCATION_1: NORMAL
MDC_IDC_SET_LEADCHNL_RV_SENSING_CATHODE_ELECTRODE_1: NORMAL
MDC_IDC_SET_LEADCHNL_RV_SENSING_CATHODE_ELECTRODE_1: NORMAL
MDC_IDC_SET_LEADCHNL_RV_SENSING_CATHODE_LOCATION_1: NORMAL
MDC_IDC_SET_LEADCHNL_RV_SENSING_CATHODE_LOCATION_1: NORMAL
MDC_IDC_SET_LEADCHNL_RV_SENSING_POLARITY: NORMAL
MDC_IDC_SET_LEADCHNL_RV_SENSING_POLARITY: NORMAL
MDC_IDC_SET_LEADCHNL_RV_SENSING_SENSITIVITY: 0.9 MV
MDC_IDC_SET_LEADCHNL_RV_SENSING_SENSITIVITY: 0.9 MV
MDC_IDC_SET_ZONE_DETECTION_INTERVAL: 350 MS
MDC_IDC_SET_ZONE_DETECTION_INTERVAL: 350 MS
MDC_IDC_SET_ZONE_DETECTION_INTERVAL: 400 MS
MDC_IDC_SET_ZONE_DETECTION_INTERVAL: 400 MS
MDC_IDC_SET_ZONE_TYPE: NORMAL
MDC_IDC_STAT_AT_BURDEN_PERCENT: 0 %
MDC_IDC_STAT_AT_BURDEN_PERCENT: 0 %
MDC_IDC_STAT_AT_DTM_END: NORMAL
MDC_IDC_STAT_AT_DTM_END: NORMAL
MDC_IDC_STAT_AT_DTM_START: NORMAL
MDC_IDC_STAT_AT_DTM_START: NORMAL
MDC_IDC_STAT_BRADY_AP_VP_PERCENT: 3.69 %
MDC_IDC_STAT_BRADY_AP_VP_PERCENT: 3.69 %
MDC_IDC_STAT_BRADY_AP_VS_PERCENT: 74.39 %
MDC_IDC_STAT_BRADY_AP_VS_PERCENT: 74.39 %
MDC_IDC_STAT_BRADY_AS_VP_PERCENT: 3.61 %
MDC_IDC_STAT_BRADY_AS_VP_PERCENT: 3.61 %
MDC_IDC_STAT_BRADY_AS_VS_PERCENT: 18.3 %
MDC_IDC_STAT_BRADY_AS_VS_PERCENT: 18.3 %
MDC_IDC_STAT_BRADY_DTM_END: NORMAL
MDC_IDC_STAT_BRADY_DTM_END: NORMAL
MDC_IDC_STAT_BRADY_DTM_START: NORMAL
MDC_IDC_STAT_BRADY_DTM_START: NORMAL
MDC_IDC_STAT_BRADY_RA_PERCENT_PACED: 78.12 %
MDC_IDC_STAT_BRADY_RA_PERCENT_PACED: 78.12 %
MDC_IDC_STAT_BRADY_RV_PERCENT_PACED: 7.3 %
MDC_IDC_STAT_BRADY_RV_PERCENT_PACED: 7.3 %
MDC_IDC_STAT_EPISODE_RECENT_COUNT: 0
MDC_IDC_STAT_EPISODE_RECENT_COUNT_DTM_END: NORMAL
MDC_IDC_STAT_EPISODE_RECENT_COUNT_DTM_START: NORMAL
MDC_IDC_STAT_EPISODE_TOTAL_COUNT: 0
MDC_IDC_STAT_EPISODE_TOTAL_COUNT: 8
MDC_IDC_STAT_EPISODE_TOTAL_COUNT: 8
MDC_IDC_STAT_EPISODE_TOTAL_COUNT_DTM_END: NORMAL
MDC_IDC_STAT_EPISODE_TOTAL_COUNT_DTM_START: NORMAL
MDC_IDC_STAT_EPISODE_TYPE: NORMAL

## 2021-04-11 ENCOUNTER — HEALTH MAINTENANCE LETTER (OUTPATIENT)
Age: 76
End: 2021-04-11

## 2021-05-20 ENCOUNTER — TRANSFERRED RECORDS (OUTPATIENT)
Dept: HEALTH INFORMATION MANAGEMENT | Facility: CLINIC | Age: 76
End: 2021-05-20

## 2021-08-17 ENCOUNTER — TRANSFERRED RECORDS (OUTPATIENT)
Dept: HEALTH INFORMATION MANAGEMENT | Facility: CLINIC | Age: 76
End: 2021-08-17

## 2021-09-26 ENCOUNTER — HEALTH MAINTENANCE LETTER (OUTPATIENT)
Age: 76
End: 2021-09-26

## 2021-11-10 ENCOUNTER — TRANSFERRED RECORDS (OUTPATIENT)
Dept: HEALTH INFORMATION MANAGEMENT | Facility: CLINIC | Age: 76
End: 2021-11-10
Payer: MEDICARE

## 2021-12-27 ENCOUNTER — OFFICE VISIT (OUTPATIENT)
Dept: URGENT CARE | Facility: URGENT CARE | Age: 76
End: 2021-12-27
Payer: MEDICARE

## 2021-12-27 VITALS
SYSTOLIC BLOOD PRESSURE: 123 MMHG | OXYGEN SATURATION: 95 % | DIASTOLIC BLOOD PRESSURE: 78 MMHG | TEMPERATURE: 98.4 F | HEART RATE: 81 BPM

## 2021-12-27 DIAGNOSIS — Z20.822 EXPOSURE TO 2019 NOVEL CORONAVIRUS: ICD-10-CM

## 2021-12-27 DIAGNOSIS — Z48.02 VISIT FOR SUTURE REMOVAL: ICD-10-CM

## 2021-12-27 DIAGNOSIS — R05.9 COUGH: Primary | ICD-10-CM

## 2021-12-27 LAB
FLUAV AG SPEC QL IA: NEGATIVE
FLUBV AG SPEC QL IA: NEGATIVE

## 2021-12-27 PROCEDURE — 99214 OFFICE O/P EST MOD 30 MIN: CPT | Performed by: PHYSICIAN ASSISTANT

## 2021-12-27 PROCEDURE — 87804 INFLUENZA ASSAY W/OPTIC: CPT | Performed by: PHYSICIAN ASSISTANT

## 2021-12-27 PROCEDURE — U0005 INFEC AGEN DETEC AMPLI PROBE: HCPCS | Performed by: PHYSICIAN ASSISTANT

## 2021-12-27 PROCEDURE — U0003 INFECTIOUS AGENT DETECTION BY NUCLEIC ACID (DNA OR RNA); SEVERE ACUTE RESPIRATORY SYNDROME CORONAVIRUS 2 (SARS-COV-2) (CORONAVIRUS DISEASE [COVID-19]), AMPLIFIED PROBE TECHNIQUE, MAKING USE OF HIGH THROUGHPUT TECHNOLOGIES AS DESCRIBED BY CMS-2020-01-R: HCPCS | Performed by: PHYSICIAN ASSISTANT

## 2021-12-27 RX ORDER — BENZONATATE 200 MG/1
200 CAPSULE ORAL 3 TIMES DAILY PRN
Qty: 25 CAPSULE | Refills: 0 | Status: SHIPPED | OUTPATIENT
Start: 2021-12-27 | End: 2022-04-01

## 2021-12-27 ASSESSMENT — ENCOUNTER SYMPTOMS
VOMITING: 0
HEADACHES: 0
ABDOMINAL PAIN: 0
FEVER: 0
DIARRHEA: 1
NAUSEA: 0
FATIGUE: 1
COUGH: 1
APPETITE CHANGE: 1
MYALGIAS: 1
RHINORRHEA: 1
SORE THROAT: 1

## 2021-12-28 LAB — SARS-COV-2 RNA RESP QL NAA+PROBE: POSITIVE

## 2021-12-28 NOTE — PROGRESS NOTES
SUBJECTIVE:   Supa Garcia is a 76 year old male presenting with a chief complaint of   Chief Complaint   Patient presents with     Urgent Care     Pharyngitis     sore throat, very tierd, diarrhea,coughing started 3 days ago, also has stitches that need to be removed from back.       He is a new patient of [place].  Patient presents with complaints of (1) suture removal on back.  He had a [facility].  No problems with sutures and (2) ST, fatigue, diarrhea and cough x 3 days.  No flu vaccination.    No blood in diarrhea.  Diarrhea x 1 - this  Morning.  Nasal discharge green.  Decreased appetite.  Vaccinated for covid with booster.      Treatment:  Tylenol, vitamin C and supplement      Review of Systems   Constitutional: Positive for appetite change and fatigue. Negative for fever.   HENT: Positive for congestion, rhinorrhea and sore throat. Negative for ear pain.    Respiratory: Positive for cough.    Gastrointestinal: Positive for diarrhea. Negative for abdominal pain, nausea and vomiting.   Musculoskeletal: Positive for myalgias.   Skin: Negative for rash.   Neurological: Negative for headaches.   All other systems reviewed and are negative.      Past Medical History:   Diagnosis Date     Allergic state      Complete heart block (H)     s/p pacer     Elevated prostate specific antigen (PSA) 12/2020     Hyperlipidemia LDL goal <130      Hypothyroidism      Syncope      Family History   Problem Relation Age of Onset     Hyperlipidemia Father      Coronary Artery Disease Father      Coronary Artery Disease Brother      Current Outpatient Medications   Medication Sig Dispense Refill     benzonatate (TESSALON) 200 MG capsule Take 1 capsule (200 mg) by mouth 3 times daily as needed for cough 25 capsule 0     CITRUS BERGAMOT PO Take by mouth daily       Coenzyme Q10 (COQ10) 200 MG CAPS Take by mouth daily       EPINEPHrine (ANY BX GENERIC EQUIV) 0.3 MG/0.3ML  injection 2-pack INJECT INTRAMUSCULARLY AS DIRECTED FOR ALLERGIC REACTION       fish oil-omega-3 fatty acids 1000 MG capsule Take 1 g by mouth daily       multivitamin (OCUVITE) TABS tablet Take 1 tablet by mouth daily       UNABLE TO FIND [employer] dietary supplement, one daily       UNABLE TO FIND Ganoderma lucidum daily       Vitamin D-Vitamin K (VITAMIN K2-VITAMIN D3 PO) Take by mouth daily       Social History     Tobacco Use     Smoking status: Never Smoker     Smokeless tobacco: Never Used   Substance Use Topics     Alcohol use: Yes     Comment: social       OBJECTIVE  /78   Pulse 81   Temp 98.4  F (36.9  C) (Oral)   SpO2 95%     Physical Exam  Vitals and nursing note reviewed.   Constitutional:       Appearance: Normal appearance. He is normal weight.   HENT:      Head: Normocephalic and atraumatic.      Right Ear: Tympanic membrane, ear canal and external ear normal.      Left Ear: Tympanic membrane, ear canal and external ear normal.      Nose: Nose normal.      Mouth/Throat:      Mouth: Mucous membranes are moist.      Pharynx: Oropharynx is clear.   Eyes:      Extraocular Movements: Extraocular movements intact.      Conjunctiva/sclera: Conjunctivae normal.   Cardiovascular:      Rate and Rhythm: Normal rate and regular rhythm.      Pulses: Normal pulses.      Heart sounds: Normal heart sounds.   Pulmonary:      Effort: Pulmonary effort is normal.      Breath sounds: Normal breath sounds.   Musculoskeletal:      Cervical back: Normal range of motion and neck supple. No muscular tenderness.   Skin:     General: Skin is warm and dry.      Comments: Upper back with appropriately, healing incision, running suture removed without any difficulty.     Neurological:      General: No focal deficit present.      Mental Status: He is alert.   Psychiatric:         Mood and Affect: Mood normal.         Behavior: Behavior normal.         Labs:  Results for orders placed or performed in visit on 12/27/21 (from  the past 24 hour(s))   Influenza A & B Antigen - Clinic Collect    Specimen: Nasopharyngeal; Swab   Result Value Ref Range    Influenza A antigen Negative Negative    Influenza B antigen Negative Negative    Narrative    Test results must be correlated with clinical data. If necessary, results should be confirmed by a molecular assay or viral culture.       X-Ray was not done.    ASSESSMENT:      ICD-10-CM    1. Cough  R05.9 Influenza A & B Antigen - Clinic Collect     benzonatate (TESSALON) 200 MG capsule   2. Exposure to 2019 novel coronavirus  Z20.822 Symptomatic; Yes COVID-19 Virus (Coronavirus) by PCR Nose   3. Visit for suture removal  Z48.02         Medical Decision Making:    Differential Diagnosis:  URI Adult/Peds:  Influenza, Viral upper respiratory illness and covid      Serious Comorbid Conditions:  Adult:  reviewed    PLAN:    Suture removed without incident.  Dermabond placed over.  Patient has a f/u appointment next week with derm.  Discussed and recommended CDC guidelines for self isolation.  COVID test pending.    Discussed reasons to seek immediate medical attention.  Additionally if no improvement or worsening in one week, may follow up with PCP and/or UC.    Tylenol/motrin prn.  Push fluids.    Spent 35 minutes reviewing chart, labs, patient education and discussion regarding differential.      Followup:    If not improving or if condition worsens, follow up with your Primary Care Provider, If not improving or if conditions worsens over the next 12-24 hours, go to the Emergency Department    Patient Instructions     Patient Education   After Your COVID-19 (Coronavirus) Test  You have been tested for COVID-19 (coronavirus).   If you'll have surgery in the next few days, we'll let you know ahead of time if you have the virus. Please call your surgeon's office with any questions.  For all other patients: Results are usually available in [employer] within 2 to 3 days.   If you do not have a MyChart  "account, you'll get a letter in the mail in about 7 to 10 days.   [employer] is often the fastest way to get test results. Please sign up if you do not already have a [employer] account. See the handout Getting COVID-19 Test Results in [employer] for help.  What if my test result is positive?  If your test is positive and you have not viewed your result in [employer], you'll get a phone call with your result. (A positive test means that you have the virus.)     Follow the tips under \"How do I self-isolate?\" below for 10 days (20 days if you have a weak immune system).    You don't need to be retested for COVID-19 before going back to school or work. As long as you're fever-free and feeling better, you can go back to school, work and other activities after waiting the 10 or 20 days.  What if I have questions after I get my results?  If you have questions about your results, please visit our testing website at [email or web address].   After 7 to 10 days, if you have not gotten your results:     Call [phone] ([phone]) and ask to speak with our COVID-19 results team.    If you're being treated at an infusion center: Call your infusion center directly.  What are the symptoms of COVID-19?  Cough, fever and trouble breathing are the most common signs of COVID-19.  Other symptoms can include new headaches, new muscle or body aches, new and unexplained fatigue (feeling very tired), chills, sore throat, congestion (stuffy or runny nose), diarrhea (loose poop), loss of taste or smell, belly pain, and nausea or vomiting (feeling sick to your stomach or throwing up).  You may already have symptoms of COVID-19, or they may show up later.  What should I do if I have symptoms?  If you're having surgery: Call your surgeon's office.  For all other patients: Stay home and away from others (self-isolate) until ...    You've had no fever--and no medicine that reduces fever--for 1 full day (24 hours), " "AND    Other symptoms have gotten better. For example, your cough or breathing has improved, AND    At least 10 days have passed since your symptoms first started.  How do I self-isolate?    Stay in your own room, even for meals. Use your own bathroom if you can.    Stay away from others in your home. No hugging, kissing or shaking hands. No visitors.    Don't go to work, school or anywhere else.    Clean \"high touch\" surfaces often (doorknobs, counters, handles). Use household cleaning spray or wipes. You'll find a full list of  on the EPA website: www.epa.gov/pesticide-registration/list-n-disinfectants-use-against-sars-cov-2.    Cover your mouth and nose with a mask or other face covering to avoid spreading germs.    Wash your hands and face often. Use soap and water.    Caregivers in these groups are at risk for severe illness due to COVID-19:  ? People 65 years and older  ? People who live in a nursing home or long-term care facility  ? People with chronic disease (lung, heart, cancer, diabetes, kidney, liver, immunologic)  ? People who have a weakened immune system, including those who:    Are in cancer treatment    Take medicine that weakens the immune system, such as corticosteroids    Had a bone marrow or organ transplant    Have an immune deficiency    Have poorly controlled HIV or AIDS    Are obese (body mass index of 40 or higher)    Smoke regularly    Caregivers should wear gloves while washing dishes, handling laundry and cleaning bedrooms and bathrooms.    Use caution when washing and drying laundry: Don't shake dirty laundry and use the warmest water setting that you can.    For more tips on managing your health at home, go to www.cdc.gov/coronavirus/2019-ncov/downloads/10Things.pdf.  How can I take care of myself at home?  1. Get lots of rest. Drink extra fluids (unless a doctor has told you not to).  2. Take Tylenol (acetaminophen) for fever or pain. If you have liver or kidney problems, ask " your family doctor if it's OK to take Tylenol.   Adults can take either:  ? 650 mg (two 325 mg pills) every 4 to 6 hours, or   ? 1,000 mg (two 500 mg pills) every 8 hours as needed.  ? Note: Don't take more than 3,000 mg in one day. Acetaminophen is found in many medicines (both prescribed and over-the-counter medicines). Read all labels to be sure you don't take too much.   For children, check the Tylenol bottle for the right dose. The dose is based on the child's age or weight.  3. If you have other health problems (like cancer, heart failure, an organ transplant or severe kidney disease): Call your specialty clinic if you don't feel better in the next 2 days.  4. Know when to call 911. Emergency warning signs include:  ? Trouble breathing or shortness of breath  ? Chest pain or pressure that doesn't go away  ? Feeling confused like you haven't felt before, or not being able to wake up  ? Bluish-colored lips or face  5. If your doctor prescribed a blood thinner medicine: Follow their instructions.  Where can I get more information?    [facility] - About COVID-19:   [email or web address]    CDC - If You're Sick: cdc.gov/coronavirus/2019-ncov/about/steps-when-sick.html    CDC - Ending Home Isolation: www.cdc.gov/coronavirus/2019-ncov/hcp/disposition-in-home-patients.html    CDC - Caring for Someone: www.cdc.gov/coronavirus/2019-ncov/if-you-are-sick/care-for-someone.html    [facility] - Interim Guidance for Hospital Discharge to Home: www.health.[facility].[place]/diseases/coronavirus/hcp/hospdischarge.pdf    [facility] clinical trials (COVID-19 research studies): [email or web address].[email or web address]/n-clinical-trials    Below are the COVID-19 hotlines at the [place] Department of Health ([facility]). Interpreters are available.  ? For health questions: Call 211-887-1888 or 1-576.620.2887 (7 a.m. to 7 p.m.)  ? For questions about schools and childcare: Call 309-483-1283 or [phone] (7 a.m. to 7 p.m.)    For  "informational purposes only. Not to replace the advice of your health care provider. Clinically reviewed by Infection Prevention and the [facility] COVID-19 Clinical Team. Copyright   2020 [facility]. All rights reserved. [employer] [id number] - Rev 11/11/20.       Patient Education     Stitches or Staple Removal  You were seen today for removal of your stitches or staples. Your wound is healing as expected. The wound has healed well enough that the stitches or staples can be removed. The wound will continue to heal for the next few months.   At this time there is no sign of infection.   Home care    If you have pain, take pain medicine as advised by your healthcare provider.     Keep your wound clean and protected by covering it with a bandage for the next week or so.     Wash your hands with soap and warm water before and after caring for your wound. This helps prevent infection.    Clean the wound gently with soap and clean, running water daily or as directed by your healthcare provider. Don't use iodine, alcohol, or other cleansers on the wound.  Gently pat it dry. Put on a new bandage, if needed. Don't reuse bandages.    If the area gets wet, gently pat it dry with a clean cloth. Replace the wet bandage with a dry one.    Check the wound daily for signs of infection. (These are listed under \"When to seek medical advice\" below.)    You may shower and bathe as usual. Swimming may be allowed, but check with your healthcare provider first.    Keep the wound out of prolonged direct sunlight. The new skin is very sensitive and can easily sunburn causing worse scarring.    Ask your provider about using over-the-counter scar removing creams if your wound is highly visible.    Follow-up care  Follow up with your healthcare provider as advised.  When to seek medical advice   Call your healthcare provider if any of the following occur:    Wound reopens or bleeds    Signs of an infection, such " "as:  ? Increasing redness or swelling around the wound  ? Increased warmth from the wound  ? Worsening pain  ? Red streaking lines away from the wound  ? Fluid draining from the wound    Fever of 100.4 F (38 C) or higher, or as directed by your healthcare provider  Cami last reviewed this educational content on 4/1/2018 2000-2021 [employer]. All rights reserved. This information is not intended as a substitute for professional medical care. Always follow your healthcare professional's instructions.           Patient Education     Viral Syndrome (Adult)  A viral illness may cause a number of symptoms such as fever. Other symptoms depend on the part of the body that the virus affects. If it settles in your nose, throat, and lungs, it may cause cough, sore throat, congestion, runny nose, headache, earache and other ear symptoms, or shortness of breath. If it settles in your stomach and intestinal tract, it may cause nausea, vomiting, cramping, and diarrhea. Sometimes it causes generalized symptoms like \"aching all over,\" feeling tired, loss of energy, or loss of appetite.  A viral illness usually lasts anywhere from several days to several weeks, but sometimes it lasts longer. In some cases, a more serious infection can look like a viral syndrome in the first few days of the illness. You may need another exam and additional tests to know the difference. Watch for the warning signs listed below for when to seek medical advice.  Home care  Follow these guidelines for taking care of yourself at home:    If symptoms are severe, rest at home for the first 2 to 3 days.    Stay away from cigarette smoke - both your smoke and the smoke from others.    You may use over-the-counter acetaminophen or ibuprofen for fever, muscle aching, and headache, unless another medicine was prescribed for this. If you have chronic liver or kidney disease or ever had a stomach ulcer or gastrointestinal bleeding, talk with " your healthcare provider before using these medicines. No one who is younger than 18 and ill with a fever should take aspirin. It may cause severe disease or death.    Your appetite may be poor, so a light diet is fine. Avoid dehydration by drinking 8 to 12, 8-ounce glasses of fluids each day. This may include water; orange juice; lemonade; apple, grape, and cranberry juice; clear fruit drinks; electrolyte replacement and sports drinks; and decaffeinated teas and coffee. If you have been diagnosed with a kidney disease, ask your healthcare provider how much and what types of fluids you should drink to prevent dehydration. If you have kidney disease, drinking too much fluid can cause it build up in the your body and be dangerous to your health.    Over-the-counter remedies won't shorten the length of the illness but may be helpful for symptoms such as cough, sore throat, nasal and sinus congestion, or diarrhea. Don't use decongestants if you have high blood pressure.  Follow-up care  Follow up with your healthcare provider if you do not improve over the next week.  Call 911  Call 911 if any of the following occur:    Convulsion    Feeling weak, dizzy, or like you are going to faint    Chest pain, or more than mild shortness of breath  When to seek medical advice  Call your healthcare provider right away if any of these occur:    Cough with lots of colored sputum (mucus) or blood in your sputum    Chest pain, shortness of breath, wheezing, or trouble breathing    Severe headache; face, neck, or ear pain    Severe, constant pain in the lower right side of your belly (abdominal)    Continued vomiting (can t keep liquids down)    Frequent diarrhea (more than 5 times a day); blood (red or black color) or mucus in diarrhea    Feeling weak, dizzy, or like you are going to faint    Extreme thirst    Fever of 100.4 F (38 C) or higher, or as directed by your healthcare provider  Cami last reviewed this educational content  on 4/1/2018 2000-2021 [employer]. All rights reserved. This information is not intended as a substitute for professional medical care. Always follow your healthcare professional's instructions.

## 2021-12-28 NOTE — PATIENT INSTRUCTIONS
"  Patient Education   After Your COVID-19 (Coronavirus) Test  You have been tested for COVID-19 (coronavirus).   If you'll have surgery in the next few days, we'll let you know ahead of time if you have the virus. Please call your surgeon's office with any questions.  For all other patients: Results are usually available in [employer] within 2 to 3 days.   If you do not have a [employer] account, you'll get a letter in the mail in about 7 to 10 days.   [employer] is often the fastest way to get test results. Please sign up if you do not already have a [employer] account. See the handout Getting COVID-19 Test Results in [facility] for help.  What if my test result is positive?  If your test is positive and you have not viewed your result in Nut[employer], you'll get a phone call with your result. (A positive test means that you have the virus.)     Follow the tips under \"How do I self-isolate?\" below for 10 days (20 days if you have a weak immune system).    You don't need to be retested for COVID-19 before going back to school or work. As long as you're fever-free and feeling better, you can go back to school, work and other activities after waiting the 10 or 20 days.  What if I have questions after I get my results?  If you have questions about your results, please visit our testing website at [email or web address].   After 7 to 10 days, if you have not gotten your results:     Call [phone] ([phone]) and ask to speak with our COVID-19 results team.    If you're being treated at an infusion center: Call your infusion center directly.  What are the symptoms of COVID-19?  Cough, fever and trouble breathing are the most common signs of COVID-19.  Other symptoms can include new headaches, new muscle or body aches, new and unexplained fatigue (feeling very tired), chills, sore throat, congestion (stuffy or runny nose), diarrhea (loose poop), loss of taste or smell, belly pain, and nausea or vomiting " "(feeling sick to your stomach or throwing up).  You may already have symptoms of COVID-19, or they may show up later.  What should I do if I have symptoms?  If you're having surgery: Call your surgeon's office.  For all other patients: Stay home and away from others (self-isolate) until ...    You've had no fever--and no medicine that reduces fever--for 1 full day (24 hours), AND    Other symptoms have gotten better. For example, your cough or breathing has improved, AND    At least 10 days have passed since your symptoms first started.  How do I self-isolate?    Stay in your own room, even for meals. Use your own bathroom if you can.    Stay away from others in your home. No hugging, kissing or shaking hands. No visitors.    Don't go to work, school or anywhere else.    Clean \"high touch\" surfaces often (doorknobs, counters, handles). Use household cleaning spray or wipes. You'll find a full list of  on the EPA website: www.epa.gov/pesticide-registration/list-n-disinfectants-use-against-sars-cov-2.    Cover your mouth and nose with a mask or other face covering to avoid spreading germs.    Wash your hands and face often. Use soap and water.    Caregivers in these groups are at risk for severe illness due to COVID-19:  ? People 65 years and older  ? People who live in a nursing home or long-term care facility  ? People with chronic disease (lung, heart, cancer, diabetes, kidney, liver, immunologic)  ? People who have a weakened immune system, including those who:    Are in cancer treatment    Take medicine that weakens the immune system, such as corticosteroids    Had a bone marrow or organ transplant    Have an immune deficiency    Have poorly controlled HIV or AIDS    Are obese (body mass index of 40 or higher)    Smoke regularly    Caregivers should wear gloves while washing dishes, handling laundry and cleaning bedrooms and bathrooms.    Use caution when washing and drying laundry: Don't shake dirty " laundry and use the warmest water setting that you can.    For more tips on managing your health at home, go to www.cdc.gov/coronavirus/2019-ncov/downloads/10Things.pdf.  How can I take care of myself at home?  1. Get lots of rest. Drink extra fluids (unless a doctor has told you not to).  2. Take Tylenol (acetaminophen) for fever or pain. If you have liver or kidney problems, ask your family doctor if it's OK to take Tylenol.   Adults can take either:  ? 650 mg (two 325 mg pills) every 4 to 6 hours, or   ? 1,000 mg (two 500 mg pills) every 8 hours as needed.  ? Note: Don't take more than 3,000 mg in one day. Acetaminophen is found in many medicines (both prescribed and over-the-counter medicines). Read all labels to be sure you don't take too much.   For children, check the Tylenol bottle for the right dose. The dose is based on the child's age or weight.  3. If you have other health problems (like cancer, heart failure, an organ transplant or severe kidney disease): Call your specialty clinic if you don't feel better in the next 2 days.  4. Know when to call 911. Emergency warning signs include:  ? Trouble breathing or shortness of breath  ? Chest pain or pressure that doesn't go away  ? Feeling confused like you haven't felt before, or not being able to wake up  ? Bluish-colored lips or face  5. If your doctor prescribed a blood thinner medicine: Follow their instructions.  Where can I get more information?    [facility] - About COVID-19:   [email or web address]    CDC - If You're Sick: cdc.gov/coronavirus/2019-ncov/about/steps-when-sick.html    CDC - Ending Home Isolation: www.cdc.gov/coronavirus/2019-ncov/hcp/disposition-in-home-patients.html    CDC - Caring for Someone: www.cdc.gov/coronavirus/2019-ncov/if-you-are-sick/care-for-someone.html    [facility] - Interim Guidance for Hospital Discharge to Home: www.health.Pending sale to [facility].[place]/diseases/coronavirus/hcp/hospdischarge.pdf    [facility]  "clinical trials (COVID-19 research studies): clinicalaffairs.[employer].[facility]/n-clinical-trials    Below are the COVID-19 hotlines at the [place] Department of Health ([facility]). Interpreters are available.  ? For health questions: Call 095-298-0236 or [phone] (7 a.m. to 7 p.m.)  ? For questions about schools and childcare: Call 166-314-6491 or 1-820.706.4728 (7 a.m. to 7 p.m.)    For informational purposes only. Not to replace the advice of your health care provider. Clinically reviewed by Infection Prevention and the [facility] COVID-19 Clinical Team. Copyright   2020 [place] [employer]. All rights reserved. [employer] 656601 - Rev 11/11/20.       Patient Education     Stitches or Staple Removal  You were seen today for removal of your stitches or staples. Your wound is healing as expected. The wound has healed well enough that the stitches or staples can be removed. The wound will continue to heal for the next few months.   At this time there is no sign of infection.   Home care    If you have pain, take pain medicine as advised by your healthcare provider.     Keep your wound clean and protected by covering it with a bandage for the next week or so.     Wash your hands with soap and warm water before and after caring for your wound. This helps prevent infection.    Clean the wound gently with soap and clean, running water daily or as directed by your healthcare provider. Don't use iodine, alcohol, or other cleansers on the wound.  Gently pat it dry. Put on a new bandage, if needed. Don't reuse bandages.    If the area gets wet, gently pat it dry with a clean cloth. Replace the wet bandage with a dry one.    Check the wound daily for signs of infection. (These are listed under \"When to seek medical advice\" below.)    You may shower and bathe as usual. Swimming may be allowed, but check with your healthcare provider first.    Keep the wound out of prolonged direct sunlight. The new skin is very sensitive " "and can easily sunburn causing worse scarring.    Ask your provider about using over-the-counter scar removing creams if your wound is highly visible.    Follow-up care  Follow up with your healthcare provider as advised.  When to seek medical advice   Call your healthcare provider if any of the following occur:    Wound reopens or bleeds    Signs of an infection, such as:  ? Increasing redness or swelling around the wound  ? Increased warmth from the wound  ? Worsening pain  ? Red streaking lines away from the wound  ? Fluid draining from the wound    Fever of 100.4 F (38 C) or higher, or as directed by your healthcare provider  Cami last reviewed this educational content on 4/1/2018 2000-2021 [employer]. All rights reserved. This information is not intended as a substitute for professional medical care. Always follow your healthcare professional's instructions.           Patient Education     Viral Syndrome (Adult)  A viral illness may cause a number of symptoms such as fever. Other symptoms depend on the part of the body that the virus affects. If it settles in your nose, throat, and lungs, it may cause cough, sore throat, congestion, runny nose, headache, earache and other ear symptoms, or shortness of breath. If it settles in your stomach and intestinal tract, it may cause nausea, vomiting, cramping, and diarrhea. Sometimes it causes generalized symptoms like \"aching all over,\" feeling tired, loss of energy, or loss of appetite.  A viral illness usually lasts anywhere from several days to several weeks, but sometimes it lasts longer. In some cases, a more serious infection can look like a viral syndrome in the first few days of the illness. You may need another exam and additional tests to know the difference. Watch for the warning signs listed below for when to seek medical advice.  Home care  Follow these guidelines for taking care of yourself at home:    If symptoms are severe, rest at " home for the first 2 to 3 days.    Stay away from cigarette smoke - both your smoke and the smoke from others.    You may use over-the-counter acetaminophen or ibuprofen for fever, muscle aching, and headache, unless another medicine was prescribed for this. If you have chronic liver or kidney disease or ever had a stomach ulcer or gastrointestinal bleeding, talk with your healthcare provider before using these medicines. No one who is younger than 18 and ill with a fever should take aspirin. It may cause severe disease or death.    Your appetite may be poor, so a light diet is fine. Avoid dehydration by drinking 8 to 12, 8-ounce glasses of fluids each day. This may include water; orange juice; lemonade; apple, grape, and cranberry juice; clear fruit drinks; electrolyte replacement and sports drinks; and decaffeinated teas and coffee. If you have been diagnosed with a kidney disease, ask your healthcare provider how much and what types of fluids you should drink to prevent dehydration. If you have kidney disease, drinking too much fluid can cause it build up in the your body and be dangerous to your health.    Over-the-counter remedies won't shorten the length of the illness but may be helpful for symptoms such as cough, sore throat, nasal and sinus congestion, or diarrhea. Don't use decongestants if you have high blood pressure.  Follow-up care  Follow up with your healthcare provider if you do not improve over the next week.  Call 911  Call 911 if any of the following occur:    Convulsion    Feeling weak, dizzy, or like you are going to faint    Chest pain, or more than mild shortness of breath  When to seek medical advice  Call your healthcare provider right away if any of these occur:    Cough with lots of colored sputum (mucus) or blood in your sputum    Chest pain, shortness of breath, wheezing, or trouble breathing    Severe headache; face, neck, or ear pain    Severe, constant pain in the lower right side of  your belly (abdominal)    Continued vomiting (can t keep liquids down)    Frequent diarrhea (more than 5 times a day); blood (red or black color) or mucus in diarrhea    Feeling weak, dizzy, or like you are going to faint    Extreme thirst    Fever of 100.4 F (38 C) or higher, or as directed by your healthcare provider  Cami last reviewed this educational content on 4/1/2018 2000-2021 [employer]. All rights reserved. This information is not intended as a substitute for professional medical care. Always follow your healthcare professional's instructions.

## 2022-02-24 ENCOUNTER — OFFICE VISIT (OUTPATIENT)
Dept: FAMILY MEDICINE | Facility: CLINIC | Age: 77
End: 2022-02-24
Payer: MEDICARE

## 2022-02-24 VITALS
SYSTOLIC BLOOD PRESSURE: 112 MMHG | RESPIRATION RATE: 16 BRPM | HEART RATE: 67 BPM | OXYGEN SATURATION: 99 % | TEMPERATURE: 97.1 F | DIASTOLIC BLOOD PRESSURE: 73 MMHG | WEIGHT: 181 LBS | HEIGHT: 71 IN | BODY MASS INDEX: 25.34 KG/M2

## 2022-02-24 DIAGNOSIS — Z00.00 ENCOUNTER FOR PREVENTIVE HEALTH EXAMINATION: Primary | ICD-10-CM

## 2022-02-24 DIAGNOSIS — N52.9 ERECTILE DYSFUNCTION, UNSPECIFIED ERECTILE DYSFUNCTION TYPE: ICD-10-CM

## 2022-02-24 LAB
ALBUMIN SERPL-MCNC: 3.8 G/DL (ref 3.4–5)
ALP SERPL-CCNC: 78 U/L (ref 40–150)
ALT SERPL W P-5'-P-CCNC: 28 U/L (ref 0–70)
ANION GAP SERPL CALCULATED.3IONS-SCNC: 7 MMOL/L (ref 3–14)
AST SERPL W P-5'-P-CCNC: 15 U/L (ref 0–45)
BASOPHILS # BLD AUTO: 0 10E3/UL (ref 0–0.2)
BASOPHILS NFR BLD AUTO: 1 %
BILIRUB SERPL-MCNC: 0.4 MG/DL (ref 0.2–1.3)
BUN SERPL-MCNC: 20 MG/DL (ref 7–30)
CALCIUM SERPL-MCNC: 9.8 MG/DL (ref 8.5–10.1)
CHLORIDE BLD-SCNC: 105 MMOL/L (ref 94–109)
CHOLEST SERPL-MCNC: 259 MG/DL
CO2 SERPL-SCNC: 26 MMOL/L (ref 20–32)
CREAT SERPL-MCNC: 0.86 MG/DL (ref 0.66–1.25)
CRP SERPL HS-MCNC: 6.6 MG/L
EOSINOPHIL # BLD AUTO: 0.1 10E3/UL (ref 0–0.7)
EOSINOPHIL NFR BLD AUTO: 4 %
ERYTHROCYTE [DISTWIDTH] IN BLOOD BY AUTOMATED COUNT: 13.1 % (ref 10–15)
FASTING STATUS PATIENT QL REPORTED: YES
FOLATE SERPL-MCNC: 19.8 NG/ML
GFR SERPL CREATININE-BSD FRML MDRD: 89 ML/MIN/1.73M2
GLUCOSE BLD-MCNC: 105 MG/DL (ref 70–99)
HCT VFR BLD AUTO: 42 % (ref 40–53)
HDLC SERPL-MCNC: 46 MG/DL
HGB BLD-MCNC: 14.1 G/DL (ref 13.3–17.7)
LDLC SERPL CALC-MCNC: 176 MG/DL
LYMPHOCYTES # BLD AUTO: 0.6 10E3/UL (ref 0.8–5.3)
LYMPHOCYTES NFR BLD AUTO: 16 %
MCH RBC QN AUTO: 32.9 PG (ref 26.5–33)
MCHC RBC AUTO-ENTMCNC: 33.6 G/DL (ref 31.5–36.5)
MCV RBC AUTO: 98 FL (ref 78–100)
MONOCYTES # BLD AUTO: 0.3 10E3/UL (ref 0–1.3)
MONOCYTES NFR BLD AUTO: 7 %
NEUTROPHILS # BLD AUTO: 2.9 10E3/UL (ref 1.6–8.3)
NEUTROPHILS NFR BLD AUTO: 73 %
NONHDLC SERPL-MCNC: 213 MG/DL
PLATELET # BLD AUTO: 250 10E3/UL (ref 150–450)
POTASSIUM BLD-SCNC: 3.9 MMOL/L (ref 3.4–5.3)
PROT SERPL-MCNC: 7.6 G/DL (ref 6.8–8.8)
RBC # BLD AUTO: 4.29 10E6/UL (ref 4.4–5.9)
SODIUM SERPL-SCNC: 138 MMOL/L (ref 133–144)
TRIGL SERPL-MCNC: 183 MG/DL
TSH SERPL DL<=0.005 MIU/L-ACNC: 0.94 MU/L (ref 0.4–4)
VIT B12 SERPL-MCNC: 995 PG/ML (ref 193–986)
WBC # BLD AUTO: 4 10E3/UL (ref 4–11)

## 2022-02-24 PROCEDURE — 82746 ASSAY OF FOLIC ACID SERUM: CPT | Performed by: INTERNAL MEDICINE

## 2022-02-24 PROCEDURE — 86141 C-REACTIVE PROTEIN HS: CPT | Performed by: INTERNAL MEDICINE

## 2022-02-24 PROCEDURE — 36415 COLL VENOUS BLD VENIPUNCTURE: CPT | Performed by: INTERNAL MEDICINE

## 2022-02-24 PROCEDURE — 85025 COMPLETE CBC W/AUTO DIFF WBC: CPT | Performed by: INTERNAL MEDICINE

## 2022-02-24 PROCEDURE — 84443 ASSAY THYROID STIM HORMONE: CPT | Performed by: INTERNAL MEDICINE

## 2022-02-24 PROCEDURE — 80053 COMPREHEN METABOLIC PANEL: CPT | Performed by: INTERNAL MEDICINE

## 2022-02-24 PROCEDURE — 82607 VITAMIN B-12: CPT | Performed by: INTERNAL MEDICINE

## 2022-02-24 PROCEDURE — 80061 LIPID PANEL: CPT | Performed by: INTERNAL MEDICINE

## 2022-02-24 PROCEDURE — 82172 ASSAY OF APOLIPOPROTEIN: CPT | Mod: 90 | Performed by: INTERNAL MEDICINE

## 2022-02-24 PROCEDURE — G0439 PPPS, SUBSEQ VISIT: HCPCS | Performed by: INTERNAL MEDICINE

## 2022-02-24 PROCEDURE — 99000 SPECIMEN HANDLING OFFICE-LAB: CPT | Performed by: INTERNAL MEDICINE

## 2022-02-24 RX ORDER — SILDENAFIL 25 MG/1
25 TABLET, FILM COATED ORAL DAILY PRN
Qty: 30 TABLET | Refills: 1 | Status: SHIPPED | OUTPATIENT
Start: 2022-02-24 | End: 2023-02-15

## 2022-02-24 ASSESSMENT — ENCOUNTER SYMPTOMS
SHORTNESS OF BREATH: 0
HEMATOCHEZIA: 0
DIZZINESS: 0
FREQUENCY: 0
ABDOMINAL PAIN: 0
PALPITATIONS: 0
CHILLS: 0
HEMATURIA: 0
MYALGIAS: 0
EYE PAIN: 0
DIARRHEA: 0
HEADACHES: 0
CONSTIPATION: 0
SORE THROAT: 0
PARESTHESIAS: 0
NAUSEA: 0
COUGH: 0
WEAKNESS: 0
NERVOUS/ANXIOUS: 0
FEVER: 0
JOINT SWELLING: 0
ARTHRALGIAS: 0
DYSURIA: 0
HEARTBURN: 0

## 2022-02-24 ASSESSMENT — ACTIVITIES OF DAILY LIVING (ADL): CURRENT_FUNCTION: NO ASSISTANCE NEEDED

## 2022-02-24 ASSESSMENT — PAIN SCALES - GENERAL: PAINLEVEL: NO PAIN (0)

## 2022-02-24 NOTE — LETTER
February 26, 2022      Supa Garcia  [address] [facility] [address]        Dear ,    Your Apo A1 level is good. Your LDL, inflammatory markers, and calcium score are not. I would offer you Pravastatin 20 mg daily with 100mg of Coenzyme Q10.      Resulted Orders   Lipid panel reflex to direct LDL Fasting   Result Value Ref Range    Cholesterol 259 (H) <200 mg/dL    Triglycerides 183 (H) <150 mg/dL    Direct Measure HDL 46 >=40 mg/dL    LDL Cholesterol Calculated 176 (H) <=100 mg/dL    Non HDL Cholesterol 213 (H) <130 mg/dL    Patient Fasting > 8hrs? Yes     Narrative    Cholesterol  Desirable:  <200 mg/dL    Triglycerides  Normal:  Less than 150 mg/dL  Borderline High:  150-199 mg/dL  High:  200-499 mg/dL  Very High:  Greater than or equal to 500 mg/dL    Direct Measure HDL  Female:  Greater than or equal to 50 mg/dL   Male:  Greater than or equal to 40 mg/dL    LDL Cholesterol  Desirable:  <100mg/dL  Above Desirable:  100-129 mg/dL   Borderline High:  130-159 mg/dL   High:  160-189 mg/dL   Very High:  >= 190 mg/dL    Non HDL Cholesterol  Desirable:  130 mg/dL  Above Desirable:  130-159 mg/dL  Borderline High:  160-189 mg/dL  High:  190-219 mg/dL  Very High:  Greater than or equal to 220 mg/dL   Apolipoprotein A1   Result Value Ref Range    Apolipo A-1 168 94 - 178 mg/dL      Comment:      REFERENCE INTERVAL: Apolipoprotein A-1    Access complete set of age- and/or gender-specific   reference intervals for this test in the [facility]   Test Directory ([email or web address]).  Performed By: [facility]  [address] [place] [id number]  : Flory Saucedo MD   CRP cardiac risk   Result Value Ref Range    C-Reactive Protein High Sensitivity 6.6 mg/L      Comment:      Low risk < 1.0 mg/L  Average risk 1.0 to 3.0 mg/L  High risk > 3.0 mg/L  Acute inflamation > 10.0 mg/L   Comprehensive metabolic panel (BMP + Alb, Alk Phos, ALT, AST, Total. Bili, TP)   Result Value  Ref Range    Sodium 138 133 - 144 mmol/L    Potassium 3.9 3.4 - 5.3 mmol/L    Chloride 105 94 - 109 mmol/L    Carbon Dioxide (CO2) 26 20 - 32 mmol/L    Anion Gap 7 3 - 14 mmol/L    Urea Nitrogen 20 7 - 30 mg/dL    Creatinine 0.86 0.66 - 1.25 mg/dL    Calcium 9.8 8.5 - 10.1 mg/dL    Glucose 105 (H) 70 - 99 mg/dL    Alkaline Phosphatase 78 40 - 150 U/L    AST 15 0 - 45 U/L    ALT 28 0 - 70 U/L    Protein Total 7.6 6.8 - 8.8 g/dL    Albumin 3.8 3.4 - 5.0 g/dL    Bilirubin Total 0.4 0.2 - 1.3 mg/dL    GFR Estimate 89 >60 mL/min/1.73m2      Comment:      Effective December 21, 2021 eGFRcr in adults is calculated using the 2021 CKD-EPI creatinine equation which includes age and gender (Link li al., [facility], DOI: 10.1056/EBFHqe6903602)   TSH with free T4 reflex   Result Value Ref Range    TSH 0.94 0.40 - 4.00 mU/L   Vitamin B12   Result Value Ref Range    Vitamin B12 995 (H) 193 - 986 pg/mL   Folate   Result Value Ref Range    Folic Acid 19.8 >=5.4 ng/mL      Comment:      Deficient: <3.4 ng/mL  Indeterminate: 3.4-5.4 ng/mL  Normal: > 5.4 ng/mL   CBC with platelets and differential   Result Value Ref Range    WBC Count 4.0 4.0 - 11.0 10e3/uL    RBC Count 4.29 (L) 4.40 - 5.90 10e6/uL    Hemoglobin 14.1 13.3 - 17.7 g/dL    Hematocrit 42.0 40.0 - 53.0 %    MCV 98 78 - 100 fL    MCH 32.9 26.5 - 33.0 pg    MCHC 33.6 31.5 - 36.5 g/dL    RDW 13.1 10.0 - 15.0 %    Platelet Count 250 150 - 450 10e3/uL    % Neutrophils 73 %    % Lymphocytes 16 %    % Monocytes 7 %    % Eosinophils 4 %    % Basophils 1 %    Absolute Neutrophils 2.9 1.6 - 8.3 10e3/uL    Absolute Lymphocytes 0.6 (L) 0.8 - 5.3 10e3/uL    Absolute Monocytes 0.3 0.0 - 1.3 10e3/uL    Absolute Eosinophils 0.1 0.0 - 0.7 10e3/uL    Absolute Basophils 0.0 0.0 - 0.2 10e3/uL       If you have any questions or concerns, please call the clinic at the number listed above.       Keep me up to date;      Anatoliy Rivers MD

## 2022-02-24 NOTE — PATIENT INSTRUCTIONS
Supa;  I think that you are doing well.    I will check a few lab tests today.    I prescribed a medication called Viagra for erectile dysfunction.  He can use this once to twice per week as needed.  Use it approximately 1 hour prior to intended sexual function    Regarding cholesterol.  A low dose of the low toxicity statin medication may give you an increase in plaque stability and lessen your chances of myocardial infarction in the future.  We will check your cholesterol today, inflammatory markers, and apolipoprotein levels.    Best regards  Anatoliy

## 2022-02-24 NOTE — PROGRESS NOTES
"SUBJECTIVE:   Supa Gracia is a 76 year old male who presents for Preventive Visit.  Patient overall is doing well.    He has been treated for prostate carcinoma and his most recent PSA was nondetectable.  Denies any new urinary symptoms.  Denies chest pain or shortness of breath.  Denies endocrine hematologic or allergic symptoms.    Patient has a history of an elevated calcium score.  His stress test have been negative however.  We discussed the importance of blood glucose control, hypertension management, and cholesterol management.  The patient and I again discussed statin medications in regards to stability of arterial plaque.  The patient stated that he may be interested in a low dose of a low toxicity statin.  He comes from a family that practices functional medicine and hence is concerned about medication side effects.    Denies endocrine hematologic or allergic symptoms otherwise.  Has a personal history of hypothyroidism and bifascicular block currently with a pacemaker.      Patient has been advised of split billing requirements and indicates understanding: Yes  Are you in the first 12 months of your Medicare coverage?      Healthy Habits:     In general, how would you rate your overall health?  Good    Frequency of exercise:  4-5 days/week    Duration of exercise:  15-30 minutes    Do you usually eat at least 4 servings of fruit and vegetables a day, include whole grains    & fiber and avoid regularly eating high fat or \"junk\" foods?  Yes    Taking medications regularly:  Yes    Medication side effects:  Not applicable    Ability to successfully perform activities of daily living:  No assistance needed    Home Safety:  No safety concerns identified    Hearing Impairment:  Difficulty following a conversation in a noisy restaurant or crowded room and no hearing concerns    In the past 6 months, have you been bothered by leaking of urine?  No    In general, how would you rate your overall mental " or emotional health?  Excellent      PHQ-2 Total Score: 0    Additional concerns today:  No    Do you feel safe in your environment? Yes    Have you ever done Advance Care Planning? (For example, a Health Directive, POLST, or a discussion with a medical provider or your loved ones about your wishes): Yes, patient states has an Advance Care Planning document and will bring a copy to the clinic.       Fall risk  Fallen 2 or more times in the past year?: No  Any fall with injury in the past year?: No    Cognitive Screening   1) Repeat 3 items (Leader, Season, Table)    2) Clock draw: NORMAL  3) 3 item recall: Recalls 3 objects  Results: 3 items recalled: COGNITIVE IMPAIRMENT LESS LIKELY    Mini-CogTM Copyright S Princess. Licensed by the author for use in [facility]; reprinted with permission (hannah@[facility]). All rights reserved.      Do you have sleep apnea, excessive snoring or daytime drowsiness?: no    Reviewed and updated as needed this visit by clinical staff                  Reviewed and updated as needed this visit by Provider                 Social History     Tobacco Use     Smoking status: Never Smoker     Smokeless tobacco: Never Used   Substance Use Topics     Alcohol use: Yes     Comment: social     If you drink alcohol do you typically have >3 drinks per day or >7 drinks per week? No    Alcohol Use 2/24/2022   Prescreen: >3 drinks/day or >7 drinks/week? No               Current providers sharing in care for this patient include:   Patient Care Team:  Anatoliy Rivers MD as PCP - General (Internal Medicine)  Anatoliy Rivers MD as Assigned PCP  Patricia Jewell PA-C as Assigned Neuroscience Provider    The following health maintenance items are reviewed in Epic and correct as of today:  Health Maintenance Due   Topic Date Due     ANNUAL REVIEW OF HM ORDERS  Never done     HEPATITIS C SCREENING  Never done     ZOSTER IMMUNIZATION (1 of 2) Never done     MEDICARE ANNUAL WELLNESS VISIT  Never  "done     DTAP/TDAP/TD IMMUNIZATION (3 - Td or Tdap) 02/27/2013     INFLUENZA VACCINE (1) Never done     FALL RISK ASSESSMENT  12/09/2021     Lab work is in process          Review of Systems   Constitutional: Negative for chills and fever.   HENT: Negative for congestion, ear pain, hearing loss and sore throat.    Eyes: Negative for pain and visual disturbance.   Respiratory: Negative for cough and shortness of breath.    Cardiovascular: Negative for chest pain, palpitations and peripheral edema.   Gastrointestinal: Negative for abdominal pain, constipation, diarrhea, heartburn, hematochezia and nausea.   Genitourinary: Positive for impotence. Negative for dysuria, frequency, genital sores, hematuria, penile discharge and urgency.   Musculoskeletal: Negative for arthralgias, joint swelling and myalgias.   Skin: Negative for rash.   Neurological: Negative for dizziness, weakness, headaches and paresthesias.   Psychiatric/Behavioral: Negative for mood changes. The patient is not nervous/anxious.      Constitutional, HEENT, cardiovascular, pulmonary, GI, , musculoskeletal, neuro, skin, endocrine and psych systems are negative, except as otherwise noted.    OBJECTIVE:   There were no vitals taken for this visit. Estimated body mass index is 26.54 kg/m  as calculated from the following:    Height as of 12/9/20: 1.816 m (5' 11.5\").    Weight as of 12/9/20: 87.5 kg (193 lb).  Physical Exam  GENERAL: healthy, alert and no distress  EYES: Eyes grossly normal to inspection, PERRL and conjunctivae and sclerae normal  HENT: ear canals and TM's normal, nose and mouth without ulcers or lesions  NECK: no adenopathy, no asymmetry, masses, or scars and thyroid normal to palpation  RESP: lungs clear to auscultation - no rales, rhonchi or wheezes  CV: regular rate and rhythm, normal S1 S2, no S3 or S4, no murmur, click or rub, no peripheral edema and peripheral pulses strong  ABDOMEN: soft, nontender, no hepatosplenomegaly, no " "masses and bowel sounds normal  MS: no gross musculoskeletal defects noted, no edema  SKIN: no suspicious lesions or rashes  NEURO: Normal strength and tone, mentation intact and speech normal  PSYCH: mentation appears normal, affect normal/bright    Diagnostic Test Results:  Labs reviewed in Epic    ASSESSMENT / PLAN:       ICD-10-CM    1. Encounter for preventive health examination  Z00.00 Lipid panel reflex to direct LDL Fasting     Apolipoprotein A1     CRP cardiac risk     Comprehensive metabolic panel (BMP + Alb, Alk Phos, ALT, AST, Total. Bili, TP)     CBC with platelets and differential     TSH with free T4 reflex     Vitamin B12     Folate     Lipid panel reflex to direct LDL Fasting     Apolipoprotein A1     CRP cardiac risk     Comprehensive metabolic panel (BMP + Alb, Alk Phos, ALT, AST, Total. Bili, TP)     CBC with platelets and differential     TSH with free T4 reflex     Vitamin B12     Folate   2. Erectile dysfunction, unspecified erectile dysfunction type  N52.9 sildenafil (VIAGRA) 25 MG tablet       Overall patient is doing well.  Has had elevated cholesterol historically.  Definitively has a high coronary calcium score.  Stress test have been negative.  Recommend rechecking cholesterol, risk stratified with CRP and apolipoprotein A1.  Consider the utilization of a low toxicity statin such as fluvastatin or pravastatin should his numbers be abnormal.    Patient is doing well status post treatment for prostate carcinoma.  Most recent PSA was undetectable.    COUNSELING:  Reviewed preventive health counseling, as reflected in patient instructions    Estimated body mass index is 26.54 kg/m  as calculated from the following:    Height as of 12/9/20: 1.816 m (5' 11.5\").    Weight as of 12/9/20: 87.5 kg (193 lb).        He reports that he has never smoked. He has never used smokeless tobacco.      Appropriate preventive services were discussed with this patient, including applicable screening as " appropriate for cardiovascular disease, diabetes, osteopenia/osteoporosis, and glaucoma.  As appropriate for age/gender, discussed screening for colorectal cancer, prostate cancer, breast cancer, and cervical cancer. Checklist reviewing preventive services available has been given to the patient.    Reviewed patients plan of care and provided an AVS. The Basic Care Plan (routine screening as documented in Health Maintenance) for Supa meets the Care Plan requirement. This Care Plan has been established and reviewed with the Patient.    Counseling Resources:  ATP IV Guidelines  Pooled Cohorts Equation Calculator  Breast Cancer Risk Calculator  Breast Cancer: Medication to Reduce Risk  FRAX Risk Assessment  ICSI Preventive Guidelines  Dietary Guidelines for Americans, 2010  USDA's MyPlate  ASA Prophylaxis  Lung CA Screening    Anatoliy Rivers MD  [facility]    Identified Health Risks:

## 2022-02-26 LAB — APO A-I SERPL-MCNC: 168 MG/DL

## 2022-02-28 ENCOUNTER — TELEPHONE (OUTPATIENT)
Dept: FAMILY MEDICINE | Facility: CLINIC | Age: 77
End: 2022-02-28
Payer: MEDICARE

## 2022-02-28 DIAGNOSIS — I45.9 HEART BLOCK: Primary | ICD-10-CM

## 2022-02-28 NOTE — TELEPHONE ENCOUNTER
sildenafil (VIAGRA) 25 MG tablet 30 tablet 1 2/24/2022  --   Sig - Route: Take 1 tablet (25 mg) by mouth daily as needed (ed) - Oral     Fax from pharmacy    Sildenafil is not formulary    Rx for 20mg tabs would be more cost effective for patient without insurance coverage.  Please send Rx if appropriate.    RT Rochelle (R)

## 2022-03-02 RX ORDER — SILDENAFIL CITRATE 20 MG/1
20 TABLET ORAL 3 TIMES DAILY
Qty: 60 TABLET | Refills: 1 | Status: SHIPPED | OUTPATIENT
Start: 2022-03-02 | End: 2023-02-15

## 2022-03-02 NOTE — TELEPHONE ENCOUNTER
Provider sent new script to pharmacy.  Signing encounter.     Audi Nevarez RN  [facility]

## 2022-03-21 ENCOUNTER — TELEPHONE (OUTPATIENT)
Dept: FAMILY MEDICINE | Facility: CLINIC | Age: 77
End: 2022-03-21
Payer: MEDICARE

## 2022-03-21 NOTE — TELEPHONE ENCOUNTER
Reason for Call: Request for an order or referral:    Order or referral being requested: PATIENT IS HAVING A PROCEDURE ON 4.4.2022. THE PRE-OP PHYSICAL IS SCHEDLUED FOR 4.1.2022. THE PATIENT WOULD LIKE TO KNOW IF IT IS POSSIBLE TO HAVE THE ORDER PUT IN FOR ASYMPTOMATIC COVID TEST PRIOR TO THE PHYSICAL DATE.    Date needed: as soon as possible    Has the patient been seen by the PCP for this problem? NO    Additional comments: PATIENT IS HAVING A PROCEDURE ON 4.4.2022. THE PRE-OP PHYSICAL IS SCHEDLUED FOR 4.1.2022. THE PATIENT WOULD LIKE TO KNOW IF IT IS POSSIBLE TO HAVE THE ORDER PUT IN FOR ASYMPTOMATIC COVID TEST PRIOR TO THE PHYSICAL DATE.    Phone number Patient can be reached at:  Home number on file [phone] (home)    Best Time:  ANYTIME AFTER 10:00 AM    Can we leave a detailed message on this number?  YES    Call taken on 3/21/2022 at 4:15 PM by Barrett Hunt

## 2022-03-21 NOTE — TELEPHONE ENCOUNTER
I called pt and told pre-surgical Covid test needs to be done 2-4 days before procedure. Told pt will do when in for pre-op on 4-1-22.       Loida ALFRED MA

## 2022-04-01 ENCOUNTER — OFFICE VISIT (OUTPATIENT)
Dept: FAMILY MEDICINE | Facility: CLINIC | Age: 77
End: 2022-04-01
Payer: MEDICARE

## 2022-04-01 VITALS
DIASTOLIC BLOOD PRESSURE: 79 MMHG | TEMPERATURE: 96.7 F | HEART RATE: 81 BPM | OXYGEN SATURATION: 98 % | WEIGHT: 189 LBS | RESPIRATION RATE: 16 BRPM | SYSTOLIC BLOOD PRESSURE: 131 MMHG | BODY MASS INDEX: 26.46 KG/M2 | HEIGHT: 71 IN

## 2022-04-01 DIAGNOSIS — M19.012 PRIMARY OSTEOARTHRITIS OF LEFT SHOULDER: ICD-10-CM

## 2022-04-01 DIAGNOSIS — Z01.818 PREOP GENERAL PHYSICAL EXAM: Primary | ICD-10-CM

## 2022-04-01 PROCEDURE — 99213 OFFICE O/P EST LOW 20 MIN: CPT | Performed by: NURSE PRACTITIONER

## 2022-04-01 PROCEDURE — U0003 INFECTIOUS AGENT DETECTION BY NUCLEIC ACID (DNA OR RNA); SEVERE ACUTE RESPIRATORY SYNDROME CORONAVIRUS 2 (SARS-COV-2) (CORONAVIRUS DISEASE [COVID-19]), AMPLIFIED PROBE TECHNIQUE, MAKING USE OF HIGH THROUGHPUT TECHNOLOGIES AS DESCRIBED BY CMS-2020-01-R: HCPCS | Performed by: NURSE PRACTITIONER

## 2022-04-01 PROCEDURE — U0005 INFEC AGEN DETEC AMPLI PROBE: HCPCS | Performed by: NURSE PRACTITIONER

## 2022-04-01 ASSESSMENT — PAIN SCALES - GENERAL: PAINLEVEL: NO PAIN (0)

## 2022-04-01 NOTE — PROGRESS NOTES
[address], [address]  [facility] [address]  Phone: [phone]  Primary Provider: Anatoliy Rivers  Pre-op Performing Provider: CLAU WEBSTER      PREOPERATIVE EVALUATION:  Today's date: 4/1/2022    Supa Garcia is a 76 year old male who presents for a preoperative evaluation.    Surgical Information:  Surgery/Procedure: left shoulder open total shoulder arthroplasty vs reverse shoulder arthroplasty  Surgery Location: [facility]  Surgeon: Dr. Chakraborty  Surgery Date: 04/04/2022  Time of Surgery: 12:45 pm  Where patient plans to recover: At home with family  Fax number for surgical facility: [phone]  Type of Anesthesia Anticipated: General    Assessment & Plan     The proposed surgical procedure is considered INTERMEDIATE risk.    Problem List Items Addressed This Visit     None      Visit Diagnoses     Preop general physical exam    -  Primary    Relevant Orders    Asymptomatic COVID-19 Virus (Coronavirus) by PCR Nose    Primary osteoarthritis of left shoulder               Implanted Device:   - Type of device: Pacer. Patient advised to bring device information on day of surgery.      Risks and Recommendations:  The patient has the following additional risks and recommendations for perioperative complications:   - No identified additional risk factors other than previously addressed    Medication Instructions:  Patient is on no chronic medications    RECOMMENDATION:  APPROVAL GIVEN to proceed with proposed procedure, without further diagnostic evaluation.        Subjective     HPI related to upcoming procedure: Going for left total shoulder replacement   Has been feeling well lately   No CP, SOB. He works out at [employer]    Has a pacer d/t complete heart block  Had Covid on 12/27/21. Also has had all vaccines       Preop Questions 4/1/2022   1. Have you ever had a heart attack or stroke? No   2. Have you ever had surgery on your heart or blood  vessels, such as a stent placement, a coronary artery bypass, or surgery on an artery in your head, neck, heart, or legs? No   3. Do you have chest pain with activity? No   4. Do you have a history of  heart failure? No   5. Do you currently have a cold, bronchitis or symptoms of other infection? No   6. Do you have a cough, shortness of breath, or wheezing? No   7. Do you or anyone in your family have previous history of blood clots? No   8. Do you or does anyone in your family have a serious bleeding problem such as prolonged bleeding following surgeries or cuts? No   9. Have you ever had problems with anemia or been told to take iron pills? No   10. Have you had any abnormal blood loss such as black, tarry or bloody stools? No   11. Have you ever had a blood transfusion? No   12. Are you willing to have a blood transfusion if it is medically needed before, during, or after your surgery? Yes   13. Have you or any of your relatives ever had problems with anesthesia? No   14. Do you have sleep apnea, excessive snoring or daytime drowsiness? No   15. Do you have any artifical heart valves or other implanted medical devices like a pacemaker, defibrillator, or continuous glucose monitor? YES -    15a. What type of device do you have? Pacemaker   15b. Name of the clinic that manages your device:  [facility]   16. Do you have artificial joints? YES -    17. Are you allergic to latex? No     Health Care Directive:  Patient has a Health Care Directive on file      Preoperative Review of :   reviewed - single short course      Status of Chronic Conditions:  See problem list for active medical problems.  Problems all longstanding and stable, except as noted/documented.  See ROS for pertinent symptoms related to these conditions.      Review of Systems  Constitutional, neuro, ENT, endocrine, pulmonary, cardiac, gastrointestinal, genitourinary, musculoskeletal, integument and psychiatric systems are  negative, except as otherwise noted.    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 06/16/2017     Priority: Medium     Bifascicular block 09/28/2015     Priority: Medium     Hypothyroidism 09/28/2015     Priority: Medium      Past Medical History:   Diagnosis Date     Allergic state      Complete heart block (H)     s/p pacer     Elevated prostate specific antigen (PSA) 12/2020     High coronary artery calcium score      Hyperlipidemia LDL goal <130      Hypothyroidism      Prostate cancer (H)      Syncope      Past Surgical History:   Procedure Laterality Date     HERNIA REPAIR       Current Outpatient Medications   Medication Sig Dispense Refill     benzonatate (TESSALON) 200 MG capsule Take 1 capsule (200 mg) by mouth 3 times daily as needed for cough 25 capsule 0     CITRUS BERGAMOT PO Take by mouth daily       Coenzyme Q10 (COQ10) 200 MG CAPS Take by mouth daily       EPINEPHrine (ANY BX GENERIC EQUIV) 0.3 MG/0.3ML injection 2-pack INJECT INTRAMUSCULARLY AS DIRECTED FOR ALLERGIC REACTION       fish oil-omega-3 fatty acids 1000 MG capsule Take 1 g by mouth daily       multivitamin (OCUVITE) TABS tablet Take 1 tablet by mouth daily       sildenafil (REVATIO) 20 MG tablet Take 1 tablet (20 mg) by mouth 3 times daily 60 tablet 1     sildenafil (VIAGRA) 25 MG tablet Take 1 tablet (25 mg) by mouth daily as needed (ed) 30 tablet 1     UNABLE TO FIND Adapten dietary supplement, one daily       UNABLE TO FIND Ganoderma lucidum daily       Vitamin D-Vitamin K (VITAMIN K2-VITAMIN D3 PO) Take by mouth daily         Allergies   Allergen Reactions     Bee Venom         Social History     Tobacco Use     Smoking status: Never Smoker     Smokeless tobacco: Never Used   Substance Use Topics     Alcohol use: Yes     Alcohol/week: 3.0 standard drinks     Types: 3 Standard drinks or  "equivalent per week     Comment: social     Family History   Problem Relation Age of Onset     Hyperlipidemia Father      Coronary Artery Disease Father      Coronary Artery Disease Brother      History   Drug Use No         Objective     /79 (BP Location: Right arm, Cuff Size: Adult Regular)   Pulse 81   Temp (!) 96.7  F (35.9  C) (Tympanic)   Resp 16   Ht 1.791 m (5' 10.5\")   Wt 85.7 kg (189 lb)   SpO2 98%   BMI 26.74 kg/m      Physical Exam    GENERAL APPEARANCE: healthy, alert and no distress     EYES: EOMI,  PERRL     RESP: lungs clear to auscultation - no rales, rhonchi or wheezes     CV: regular rates and rhythm, normal S1 S2, no S3 or S4 and no click or rub. Faint systolic aortic murmur     MS: extremities normal- no gross deformities noted, no evidence of inflammation in joints, FROM in all extremities.     SKIN: no suspicious lesions or rashes     NEURO: Normal strength and tone, sensory exam grossly normal, mentation intact and speech normal     PSYCH: mentation appears normal. and affect normal/bright    Recent Labs   Lab Test 02/24/22  1041 12/09/20  1054   HGB 14.1 14.0    177    140   POTASSIUM 3.9 4.4   CR 0.86 0.92        Diagnostics:  No labs were ordered during this visit.   No EKG this visit, completed in the last 90 days.    Revised Cardiac Risk Index (RCRI):  The patient has the following serious cardiovascular risks for perioperative complications:   - No serious cardiac risks = 0 points     RCRI Interpretation: 0 points: Class I (very low risk - 0.4% complication rate)           Signed Electronically by: ELIOT Barahona CNP  Copy of this evaluation report is provided to requesting physician.      "

## 2022-04-01 NOTE — PATIENT INSTRUCTIONS
Preparing for Your Surgery  Getting started  A nurse will call you to review your health history and instructions. They will give you an arrival time based on your scheduled surgery time. Please be ready to share:    Your doctor's clinic name and phone number    Your medical, surgical and anesthesia history    A list of allergies and sensitivities    A list of medicines, including herbal treatments and over-the-counter drugs    Whether the patient has a legal guardian (ask how to send us the papers in advance)  Please tell us if you're pregnant--or if there's any chance you might be pregnant. Some surgeries may injure a fetus (unborn baby), so they require a pregnancy test. Surgeries that are safe for a fetus don't always need a test, and you can choose whether to have one.   If you have a child who's having surgery, please ask for a copy of Preparing for Your Child's Surgery.    Preparing for surgery    Within 30 days of surgery: Have a pre-op exam (sometimes called an H&P, or History and Physical). This can be done at a clinic or pre-operative center.  ? If you're having a , you may not need this exam. Talk to your care team.    At your pre-op exam, talk to your care team about all medicines you take. If you need to stop any medicines before surgery, ask when to start taking them again.  ? We do this for your safety. Many medicines can make you bleed too much during surgery. Some change how well surgery (anesthesia) drugs work.    Call your insurance company to let them know you're having surgery. (If you don't have insurance, call [phone].)    Call your clinic if there's any change in your health. This includes signs of a cold or flu (sore throat, runny nose, cough, rash, fever). It also includes a scrape or scratch near the surgery site.    If you have questions on the day of surgery, call your hospital or surgery center.  COVID testing  You may need to be tested for COVID-19 before having  surgery. If so, your surgical team will give you instructions for scheduling this test, separate from your preoperative history and physical.  Eating and drinking guidelines  For your safety: Unless your surgeon tells you otherwise, follow the guidelines below.    Eat and drink as usual until 8 hours before surgery. After that, no food or milk.    Drink clear liquids until 2 hours before surgery. These are liquids you can see through, like water, Gatorade and Propel Water. You may also have black coffee and tea (no cream or milk).    Nothing by mouth within 2 hours of surgery. This includes gum, candy and breath mints.    If you drink alcohol: Stop drinking it the night before surgery.    If your care team tells you to take medicine on the morning of surgery, it's okay to take it with a sip of water.  Preventing infection    Shower or bathe the night before and morning of your surgery. Follow the instructions your clinic gave you. (If no instructions, use regular soap.)    Don't shave or clip hair near your surgery site. We'll remove the hair if needed.    Don't smoke or vape the morning of surgery. You may chew nicotine gum up to 2 hours before surgery. A nicotine patch is okay.  ? Note: Some surgeries require you to completely quit smoking and nicotine. Check with your surgeon.    Your care team will make every effort to keep you safe from infection. We will:  ? Clean our hands often with soap and water (or an alcohol-based hand rub).  ? Clean the skin at your surgery site with a special soap that kills germs.  ? Give you a special gown to keep you warm. (Cold raises the risk of infection.)  ? Wear special hair covers, masks, gowns and gloves during surgery.  ? Give antibiotic medicine, if prescribed. Not all surgeries need antibiotics.  What to bring on the day of surgery    Photo ID and insurance card    Copy of your health care directive, if you have one    Glasses and hearing aides (bring cases)  ? You can't  wear contacts during surgery    Inhaler and eye drops, if you use them (tell us about these when you arrive)    CPAP machine or breathing device, if you use them    A few personal items, if spending the night    If you have . . .  ? A pacemaker, ICD (cardiac defibrillator) or other implant: Bring the ID card.  ? An implanted stimulator: Bring the remote control.  ? A legal guardian: Bring a copy of the certified (court-stamped) guardianship papers.  Please remove any jewelry, including body piercings. Leave jewelry and other valuables at home.  If you're going home the day of surgery    You must have a responsible adult drive you home. They should stay with you overnight as well.    If you don't have someone to stay with you, and you aren't safe to go home alone, we may keep you overnight. Insurance often won't pay for this.  After surgery  If it's hard to control your pain or you need more pain medicine, please call your surgeon's office.  Questions?   If you have any questions for your care team, list them here: _________________________________________________________________________________________________________________________________________________________________________ ____________________________________ ____________________________________ ____________________________________  For informational purposes only. Not to replace the advice of your health care provider. Copyright   2003, 2019 [facility]. All rights reserved. Clinically reviewed by Julianna Montenegro MD. Osito [id number] - REV 07/21.    Before Your Procedure or Hospital Admission  Testing for COVID-19 (Coronavirus)  Thank you for choosing [facility] for your health care needs. The COVID-19 pandemic is a very challenging time for everyone.   Our goal is to keep you and our team here at [facility] safe and healthy. We've taken many steps to make this happen. For example:    We test and screen our staff, care teams and  "patients for COVID-19.    Everyone at [facility] must wear a mask and stay 6 feet apart.    We are limiting hospital and clinic visitors.  Before you come in  If you test COVID-19 positive with any kind of test before your surgery date, call your surgeon's office. We need to know the date of your positive test. We may need to re-schedule your surgery.  Unless you've had a positive COVID-19 test within the past 90 days, you must get tested for COVID-19 even if you've been vaccinated. Your test needs to happen 2 to 4 days before you check in to the hospital or surgery site.  A clinic scheduler will call you about a week in advance to set up a testing time at one of our labs.  Note: If you have a test anywhere but [facility], be sure you get an RT-PCR or an NAAT (Nucleic Acid Amplification Test) test.  Do NOT get a \"rapid antigen\" test. Negative results from \"rapid antigen\" tests are NOT accepted before your surgery.  After the test, please stay at home and out of contact with other people. This will help prevent possible COVID-19 exposure before your treatment. Please follow all current safety guidelines, including:    Limit trips outside your home.    Limit the number of people you see.    Always wear a mask outside your home.    Use social distancing. Stay 6 feet away from others whenever you can.    Wash your hands often.  If your test shows you have COVID-19  If your test is positive, we'll let you know. A positive test means that you have the virus.   We'll probably have to postpone your admission, surgery or procedure. Your doctor will discuss this with you. After that, we'll let you know what to do and when you can re-schedule.   We may need to cancel your treatment on short notice for other reasons, too.  If your test shows you DON'T have COVID-19  Even if your test is negative, you can still get COVID-19. It's rare but, sometimes, the test result is wrong. You could also catch the virus after " taking the test.   There's a very small chance that you could catch COVID-19 in the hospital or surgery center. [facility] has taken many steps to prevent this from happening.   Day of your surgery or procedure    Please come wearing a face covering that covers both your nose and mouth.    When you arrive, we'll ask you some questions to find out if you've had any exposures to COVID-19, or have any signs of COVID-19.    Ask your care team if you can have visitors. All visitors must wear face coverings and will be screened for exposure to, or signs of, COVID-19.  ? Even if no visitors are allowed, you can still have with you:    Your legal guardian or legal decision maker    Someone to help you, if you are disabled    A parent and one other visitor, if you are younger than 18 years old    A partner and a , if you are in labor  ? We might need to teach you about taking care of yourself after surgery. If so, a visitor can come into the hospital to learn about it, too.  ? The rules for visitors change often, depending on how much the virus is spreading. To learn more, see Visiting a Loved One in the Hospital during the COVID-19 Outbreak.  Please call your care team, hospital or surgery center if you have any questions. We thank you for your understanding and for choosing [facility] for your care.   Possible surgery delay    Like you, we want your surgery to happen when it's scheduled. But sometimes the hospital is so full that it's not safe for you to have your surgery. This is especially true during the pandemic. Your surgery may need to be re-scheduled at a later date. If this happens, we will call and tell you.  Questions and answers  Does it matter where I get tested for COVID-19?  Yes. We urge you to get tested at one of our [facility] COVID-19 testing sites. These tests will be either RT-PCR or NAAT, and are accepted. We process these tests in our lab and can get the results quickly. Your  "[facility] care team needs to get your results before you check in.  What should I do if I can't get tested at [facility]?  You can get tested somewhere else, but you'll need to take these extra steps:   1. Contact your family doctor or clinic to arrange your test.  2. Take the test within 4 days of your surgery or procedure. We can't accept tests older than 4 days.  3. Make sure you're getting an RT-PCR test, or a NAAT. Some places use \"rapid antigen\" tests, but we do NOT accept negative results from those tests before your surgery.  4. Make sure your doctor or clinic faxes your results to [facility] at [phone]. Or take a photo of the results and upload it into [employer].  If we don't get your results in time, we may have to delay or cancel your treatment.  For informational purposes only. Not to replace the advice of your health care provider. Copyright   2020 [employer]. All rights reserved. Clinically reviewed by Infection Prevention and the [facility] COVID-19 Clinical Team. [employer] [id number] - Rev 02/01/22.    "

## 2022-04-03 LAB — SARS-COV-2 RNA RESP QL NAA+PROBE: NEGATIVE

## 2022-07-28 ENCOUNTER — OFFICE VISIT (OUTPATIENT)
Dept: NEUROSURGERY | Facility: CLINIC | Age: 77
End: 2022-07-28
Payer: MEDICARE

## 2022-07-28 VITALS — DIASTOLIC BLOOD PRESSURE: 66 MMHG | SYSTOLIC BLOOD PRESSURE: 138 MMHG | HEART RATE: 79 BPM | OXYGEN SATURATION: 98 %

## 2022-07-28 DIAGNOSIS — G89.29 CHRONIC BILATERAL LOW BACK PAIN WITH BILATERAL SCIATICA: Primary | ICD-10-CM

## 2022-07-28 DIAGNOSIS — M54.42 CHRONIC BILATERAL LOW BACK PAIN WITH BILATERAL SCIATICA: Primary | ICD-10-CM

## 2022-07-28 DIAGNOSIS — M54.41 CHRONIC BILATERAL LOW BACK PAIN WITH BILATERAL SCIATICA: Primary | ICD-10-CM

## 2022-07-28 PROCEDURE — 99214 OFFICE O/P EST MOD 30 MIN: CPT | Performed by: PHYSICIAN ASSISTANT

## 2022-07-28 ASSESSMENT — PAIN SCALES - GENERAL: PAINLEVEL: MODERATE PAIN (4)

## 2022-07-28 NOTE — PROGRESS NOTES
Neurosurgery Consult    HPI    Mr. Garcia is a 77-year-old male who presents to clinic for evaluation of low back pain and intermittent bilateral leg pain.  He states that symptoms are most bothersome when he sits for long time.  He denies any limitation in how far he can walk.  He denies bowel or bladder symptoms or saddle anesthesia    He has tried physical therapy, he has done some chiropractic.  He has had stem cell and PRP injections in his back and states he provided him with some mild temporary relief.  He has not had an epidural steroid injection.        Medical history  Recently recovered from prostate cancer  Has a pacemaker    Social history  Retired, enjoys playing golf      B/P: 138/66, T: Data Unavailable, P: 79, R: Data Unavailable       Exam    Alert and oriented no acute distress  Bilateral lower extremities with 5/5 strength  Reflexes 2+ patella/ankle  Negative straight leg raise bilaterally  Negative ankle clonus negative Babinski bilaterally  Lumbar spine nontender to palpation  Able to stand on heels and toes  Gait is normal    Imaging    Lumbar MRI from 2020 was without high-grade central stenosis, demonstrated some multilevel foraminal stenosis, although the images were not of the highest quality.    Assessment    Back pain and bilateral radiculopathy    Plan:      I recommend the patient update his lumbar MRI given that his symptoms have been worsening, and I will follow-up with him with the results.  I also provided him with a referral for physical therapy.    Addendum 8/26/2022    Lumbar MRI demonstrates multilevel degenerative changes, mild left curvature, moderate left foraminal narrowing at L4-5, mild right foraminal narrowing at L1-2, overall findings may be slightly worse than the prior MRI.    The findings on the MRI and not clearly explain all of his symptoms, particularly his back pain which could be multifactorial.  I recommend he proceed with physical therapy at this time.   Contact the patient and left him a message to contact the clinic for further discussion.    Total time of 30 minutes spent with the patient today in counseling and coordination of care.

## 2022-07-28 NOTE — LETTER
7/28/2022         RE: Supa Garcia  [id number] [facility] [address] [address]        Dear Colleague,    Thank you for referring your patient, Supa Garcai, to the [facility]. Please see a copy of my visit note below.    Neurosurgery Consult    HPI    Mr. Garcia is a 77-year-old male who presents to clinic for evaluation of low back pain and intermittent bilateral leg pain.  He states that symptoms are most bothersome when he sits for long time.  He denies any limitation in how far he can walk.  He denies bowel or bladder symptoms or saddle anesthesia    He has tried physical therapy, he has done some chiropractic.  He has had stem cell and PRP injections in his back and states he provided him with some mild temporary relief.  He has not had an epidural steroid injection.        Medical history  Recently recovered from prostate cancer  Has a pacemaker    Social history  Retired, enjoys playing golf      B/P: 138/66, T: Data Unavailable, P: 79, R: Data Unavailable       Exam    Alert and oriented no acute distress  Bilateral lower extremities with 5/5 strength  Reflexes 2+ patella/ankle  Negative straight leg raise bilaterally  Negative ankle clonus negative Babinski bilaterally  Lumbar spine nontender to palpation  Able to stand on heels and toes  Gait is normal    Imaging    Lumbar MRI from 2020 was without high-grade central stenosis, demonstrated some multilevel foraminal stenosis, although the images were not of the highest quality.    Assessment    Back pain and bilateral radiculopathy    Plan:      I recommend the patient update his lumbar MRI given that his symptoms have been worsening, and I will follow-up with him with the results.  I also provided him with a referral for physical therapy.    Total time of 30 minutes spent with the patient today in counseling and coordination of care.      Again, thank you for allowing me to participate in the care of your  patient.        Sincerely,        Patricia Jewell PA-C

## 2022-07-28 NOTE — TELEPHONE ENCOUNTER
Patient had OV with Patricia Jewell PA-C  Today 7/28/22. Patricia ordered lumbar MRI. Patient is claustrophobic and prefers open however, he has a medtronic pacemaker.     Writer called and spoke to [facility] they have open MRIs available which are not compatible with pacemakers. They use their 1.5 scanner for pacemaker patients and is closed. They are booked out through the 1st week in September.     Writer called [facility] imaging scheduling and they were able to schedule patient for MRI 8/18/22 at 10 am , 9 am check in. Again, no open MRI [place] site wide.     Patient is OK with closed MRI if he could be premedicated prior. Verified he would have a . Rx for valium routed to Patricia Jewell PA-C for approval.

## 2022-07-29 RX ORDER — DIAZEPAM 5 MG
5-10 TABLET ORAL SEE ADMIN INSTRUCTIONS
Qty: 2 TABLET | Refills: 0 | Status: SHIPPED | OUTPATIENT
Start: 2022-07-29 | End: 2023-02-15

## 2022-08-11 ENCOUNTER — DOCUMENTATION ONLY (OUTPATIENT)
Dept: CARDIOLOGY | Facility: CLINIC | Age: 77
End: 2022-08-11

## 2022-08-11 NOTE — PROGRESS NOTES
Received MRI Cardiology Clearance form from [facility] MRI department.  Form completed, signed by MD, and faxed back to MRI at [phone].      KATHRYN Aldridge

## 2022-08-16 DIAGNOSIS — I44.2 COMPLETE HEART BLOCK (H): Primary | ICD-10-CM

## 2022-08-16 DIAGNOSIS — Z45.02 ENCOUNTER FOR ADJUSTMENT AND MANAGEMENT OF AUTOMATIC IMPLANTABLE CARDIAC DEFIBRILLATOR: ICD-10-CM

## 2022-08-24 ENCOUNTER — TELEPHONE (OUTPATIENT)
Dept: MEDSURG UNIT | Facility: CLINIC | Age: 77
End: 2022-08-24

## 2022-08-26 ENCOUNTER — HOSPITAL ENCOUNTER (OUTPATIENT)
Dept: MRI IMAGING | Facility: CLINIC | Age: 77
Discharge: HOME | End: 2022-08-26
Attending: PHYSICIAN ASSISTANT
Payer: MEDICARE

## 2022-08-26 ENCOUNTER — HOSPITAL ENCOUNTER (OUTPATIENT)
Facility: CLINIC | Age: 77
Discharge: HOME | End: 2022-08-26
Admitting: RADIOLOGY
Payer: MEDICARE

## 2022-08-26 VITALS — OXYGEN SATURATION: 95 % | HEART RATE: 80 BPM

## 2022-08-26 DIAGNOSIS — M54.41 CHRONIC BILATERAL LOW BACK PAIN WITH BILATERAL SCIATICA: ICD-10-CM

## 2022-08-26 DIAGNOSIS — G89.29 CHRONIC BILATERAL LOW BACK PAIN WITH BILATERAL SCIATICA: ICD-10-CM

## 2022-08-26 DIAGNOSIS — M54.42 CHRONIC BILATERAL LOW BACK PAIN WITH BILATERAL SCIATICA: ICD-10-CM

## 2022-08-26 PROCEDURE — 999N000154 HC STATISTIC RADIOLOGY XRAY, US, CT, MAR, NM

## 2022-08-26 PROCEDURE — G1010 CDSM STANSON: HCPCS

## 2022-08-26 ASSESSMENT — ACTIVITIES OF DAILY LIVING (ADL)
ADLS_ACUITY_SCORE: 35
ADLS_ACUITY_SCORE: 35

## 2022-08-26 NOTE — PROGRESS NOTES
1235 Pt here for MRI w/ PPM.      PPM placed in MRI safe mode via medtronic device rep virtually.    1240 MRI initiated. HR & sat documented Q 5 min. See Doc Flowsheet.    1310 MRI completed.  Med[employer] rep called to return PPM to previous settings.    1315 PPM returned to previous settings.    1320 Pt discharged per ambulatory. All personal belongings taken with pt.

## 2022-10-15 ENCOUNTER — APPOINTMENT (OUTPATIENT)
Dept: CT IMAGING | Facility: CLINIC | Age: 77
End: 2022-10-15
Attending: EMERGENCY MEDICINE
Payer: MEDICARE

## 2022-10-15 ENCOUNTER — HOSPITAL ENCOUNTER (EMERGENCY)
Facility: CLINIC | Age: 77
Discharge: HOME | End: 2022-10-15
Attending: EMERGENCY MEDICINE | Admitting: EMERGENCY MEDICINE
Payer: MEDICARE

## 2022-10-15 VITALS
HEART RATE: 69 BPM | TEMPERATURE: 98 F | RESPIRATION RATE: 14 BRPM | OXYGEN SATURATION: 99 % | SYSTOLIC BLOOD PRESSURE: 160 MMHG | DIASTOLIC BLOOD PRESSURE: 77 MMHG

## 2022-10-15 DIAGNOSIS — R42 DIZZINESS: ICD-10-CM

## 2022-10-15 LAB
ALBUMIN SERPL-MCNC: 3.5 G/DL (ref 3.4–5)
ALP SERPL-CCNC: 79 U/L (ref 40–150)
ALT SERPL W P-5'-P-CCNC: 28 U/L (ref 0–70)
ANION GAP SERPL CALCULATED.3IONS-SCNC: 7 MMOL/L (ref 3–14)
AST SERPL W P-5'-P-CCNC: 16 U/L (ref 0–45)
ATRIAL RATE - MUSE: 61 BPM
BASOPHILS # BLD AUTO: 0 10E3/UL (ref 0–0.2)
BASOPHILS NFR BLD AUTO: 1 %
BILIRUB SERPL-MCNC: 0.4 MG/DL (ref 0.2–1.3)
BUN SERPL-MCNC: 21 MG/DL (ref 7–30)
CALCIUM SERPL-MCNC: 9.2 MG/DL (ref 8.5–10.1)
CHLORIDE BLD-SCNC: 107 MMOL/L (ref 94–109)
CO2 SERPL-SCNC: 27 MMOL/L (ref 20–32)
CREAT SERPL-MCNC: 0.68 MG/DL (ref 0.66–1.25)
DIASTOLIC BLOOD PRESSURE - MUSE: NORMAL MMHG
EOSINOPHIL # BLD AUTO: 0.1 10E3/UL (ref 0–0.7)
EOSINOPHIL NFR BLD AUTO: 3 %
ERYTHROCYTE [DISTWIDTH] IN BLOOD BY AUTOMATED COUNT: 13.7 % (ref 10–15)
GFR SERPL CREATININE-BSD FRML MDRD: >90 ML/MIN/1.73M2
GLUCOSE BLD-MCNC: 104 MG/DL (ref 70–99)
HCT VFR BLD AUTO: 36.4 % (ref 40–53)
HGB BLD-MCNC: 12.6 G/DL (ref 13.3–17.7)
HOLD SPECIMEN: NORMAL
IMM GRANULOCYTES # BLD: 0 10E3/UL
IMM GRANULOCYTES NFR BLD: 0 %
INTERPRETATION ECG - MUSE: NORMAL
LYMPHOCYTES # BLD AUTO: 0.7 10E3/UL (ref 0.8–5.3)
LYMPHOCYTES NFR BLD AUTO: 16 %
MCH RBC QN AUTO: 32.3 PG (ref 26.5–33)
MCHC RBC AUTO-ENTMCNC: 34.6 G/DL (ref 31.5–36.5)
MCV RBC AUTO: 93 FL (ref 78–100)
MONOCYTES # BLD AUTO: 0.3 10E3/UL (ref 0–1.3)
MONOCYTES NFR BLD AUTO: 8 %
NEUTROPHILS # BLD AUTO: 3.1 10E3/UL (ref 1.6–8.3)
NEUTROPHILS NFR BLD AUTO: 72 %
NRBC # BLD AUTO: 0 10E3/UL
NRBC BLD AUTO-RTO: 0 /100
P AXIS - MUSE: 0 DEGREES
PLATELET # BLD AUTO: 176 10E3/UL (ref 150–450)
POTASSIUM BLD-SCNC: 3.9 MMOL/L (ref 3.4–5.3)
PR INTERVAL - MUSE: 174 MS
PROT SERPL-MCNC: 6.9 G/DL (ref 6.8–8.8)
QRS DURATION - MUSE: 174 MS
QT - MUSE: 462 MS
QTC - MUSE: 465 MS
R AXIS - MUSE: -7 DEGREES
RBC # BLD AUTO: 3.9 10E6/UL (ref 4.4–5.9)
SODIUM SERPL-SCNC: 141 MMOL/L (ref 133–144)
SYSTOLIC BLOOD PRESSURE - MUSE: NORMAL MMHG
T AXIS - MUSE: 95 DEGREES
VENTRICULAR RATE- MUSE: 61 BPM
WBC # BLD AUTO: 4.3 10E3/UL (ref 4–11)

## 2022-10-15 PROCEDURE — 250N000013 HC RX MED GY IP 250 OP 250 PS 637: Performed by: EMERGENCY MEDICINE

## 2022-10-15 PROCEDURE — 96360 HYDRATION IV INFUSION INIT: CPT

## 2022-10-15 PROCEDURE — 85025 COMPLETE CBC W/AUTO DIFF WBC: CPT | Performed by: EMERGENCY MEDICINE

## 2022-10-15 PROCEDURE — 99285 EMERGENCY DEPT VISIT HI MDM: CPT | Mod: 25

## 2022-10-15 PROCEDURE — 36415 COLL VENOUS BLD VENIPUNCTURE: CPT | Performed by: EMERGENCY MEDICINE

## 2022-10-15 PROCEDURE — 80053 COMPREHEN METABOLIC PANEL: CPT | Performed by: EMERGENCY MEDICINE

## 2022-10-15 PROCEDURE — 93005 ELECTROCARDIOGRAM TRACING: CPT

## 2022-10-15 PROCEDURE — G1010 CDSM STANSON: HCPCS

## 2022-10-15 PROCEDURE — 82040 ASSAY OF SERUM ALBUMIN: CPT | Performed by: EMERGENCY MEDICINE

## 2022-10-15 PROCEDURE — 258N000003 HC RX IP 258 OP 636: Performed by: EMERGENCY MEDICINE

## 2022-10-15 PROCEDURE — 96361 HYDRATE IV INFUSION ADD-ON: CPT

## 2022-10-15 RX ORDER — MECLIZINE HYDROCHLORIDE 25 MG/1
50 TABLET ORAL ONCE
Status: COMPLETED | OUTPATIENT
Start: 2022-10-15 | End: 2022-10-15

## 2022-10-15 RX ADMIN — SODIUM CHLORIDE 1000 ML: 9 INJECTION, SOLUTION INTRAVENOUS at 12:01

## 2022-10-15 RX ADMIN — MECLIZINE HYDROCHLORIDE 50 MG: 25 TABLET ORAL at 12:01

## 2022-10-15 ASSESSMENT — ENCOUNTER SYMPTOMS
LIGHT-HEADEDNESS: 0
COUGH: 0
SHORTNESS OF BREATH: 0
CHILLS: 0
FEVER: 0
PALPITATIONS: 0
DIZZINESS: 0

## 2022-10-15 ASSESSMENT — ACTIVITIES OF DAILY LIVING (ADL)
ADLS_ACUITY_SCORE: 35
ADLS_ACUITY_SCORE: 35

## 2022-10-15 NOTE — ED PROVIDER NOTES
"  History   Chief Complaint:  Dizziness (Pt sts he felt dizzy when getting up at 2 am felt fine when lying down. Pt had same episode this am when getting up)       The history is provided by the patient.      Supa Garcia is a 77 year old male with history of complete heart block and syncope who presents with unsteadiness upon standing. He first noticed his unsteadiness this morning when he tried to get up to use the restroom. He laid back down and continued to try two more times, but did not feel stable enough to walk. The patient states that the only other symptom he has been experiencing is congestion. He interrogated his pacemaker, that was placed in June of 2020, this morning and [employer] said everything looked normal for the past 24 hours. The patient denies that he is experiencing light headedness, recent illness, fever, chills, chest pain, shortness of breath, cough, or palpitations. He denies that he is experiencing room-spinning dizziness. He recently traveled to [place] and returned 2 weeks ago.     Review of Systems   Constitutional: Negative for chills and fever.   Respiratory: Negative for cough and shortness of breath.    Cardiovascular: Negative for chest pain and palpitations.   Musculoskeletal: Negative for gait problem (\"unsteady\").   Neurological: Negative for dizziness and light-headedness.        Intentional gait, unstable   All other systems reviewed and are negative.    Allergies:  Bee pollen  Bee Venom    Medications:  Citrus bergamot  Coenzyme Q10  Diazepam  Epinephrine  Fish oil-omega-3 fatty acids  Multivitamin  Sildenafil  Vitamin D-Vitamin K  Amoxicillin    Past Medical History:     Syncope  Complete heart block  Hypothyroidism  Bifascicular block  Hyperlipidemia  Osteoarthritis  Cancer  Hypogonadism  Actinic keratosis  Degenerative skin disorder    Past Surgical History:    Hernia repair  Knee arthroplasty x2  Rotator cuff repair  Colonoscopy  Shoulder arthroplasty " x2  Heart pacemaker    Family History:    Father: hyperlipidemia, CAD  Brother: CAD    Social History:  The patient presents to the ED with relative.  PCP: Anatoliy Rivers     Physical Exam     Patient Vitals for the past 24 hrs:   BP Temp Temp src Pulse Resp SpO2   10/15/22 1110 -- -- -- -- 14 --   10/15/22 1041 (!) 160/77 98  F (36.7  C) Oral 69 16 99 %     Physical Exam  Physical Exam   Nursing note and vitals reviewed.  General: Oriented to person, place, and time. Appears well-developed and well-nourished.   Head: No signs of trauma.   Mouth/Throat: Oropharynx is clear and moist.   Eyes: Conjunctivae are normal. Pupils are equal, round, and reactive to light.   Neck: Normal range of motion. No nuchal rigidity.   Cardiovascular: Normal rate and regular rhythm.    Respiratory: Effort normal and breath sounds normal. No respiratory distress.   Abdominal: Soft. There is no tenderness. There is no guarding.   Musculoskeletal: Normal range of motion. no edema.   Neurological: The patient is alert and oriented to person, place, and time.  PERRLA, EOMI, visual fields intact, strength in upper/lower extremities normal and symmetrical.   Sensation normal. Speech normal  GCS eye subscore is 4. GCS verbal subscore is 5. GCS motor subscore is 6.   Skin: Skin is warm and dry. No rash noted.   Psychiatric: normal mood and affect. behavior is normal.     Emergency Department Course   ECG  ECG taken at 1106, ECG read at 1114  Atrial-paced rhythm  Left bundle branch block   Rate 61 bpm. NV interval 174 ms. QRS duration 174 ms. QT/QTc 462/465 ms. P-R-T axes 0 -7 95.     Imaging:  Head CT w/o contrast   Final Result   IMPRESSION:   1.  No acute intracranial abnormality.        Report per radiology    Laboratory:  Labs Ordered and Resulted from Time of ED Arrival to Time of ED Departure   COMPREHENSIVE METABOLIC PANEL - Abnormal       Result Value    Sodium 141      Potassium 3.9      Chloride 107      Carbon Dioxide (CO2) 27       Anion Gap 7      Urea Nitrogen 21      Creatinine 0.68      Calcium 9.2      Glucose 104 (*)     Alkaline Phosphatase 79      AST 16      ALT 28      Protein Total 6.9      Albumin 3.5      Bilirubin Total 0.4      GFR Estimate >90     CBC WITH PLATELETS AND DIFFERENTIAL - Abnormal    WBC Count 4.3      RBC Count 3.90 (*)     Hemoglobin 12.6 (*)     Hematocrit 36.4 (*)     MCV 93      MCH 32.3      MCHC 34.6      RDW 13.7      Platelet Count 176      % Neutrophils 72      % Lymphocytes 16      % Monocytes 8      % Eosinophils 3      % Basophils 1      % Immature Granulocytes 0      NRBCs per 100 WBC 0      Absolute Neutrophils 3.1      Absolute Lymphocytes 0.7 (*)     Absolute Monocytes 0.3      Absolute Eosinophils 0.1      Absolute Basophils 0.0      Absolute Immature Granulocytes 0.0      Absolute NRBCs 0.0          Procedures  [address] test was performed by the nurse. The patient was able to ambulate without complications.    Emergency Department Course:     Reviewed:  I reviewed nursing notes, vitals, past medical history and Care Everywhere    Assessments:  1115 I obtained history and examined the patient as noted above.   1210 I rechecked the patient and explained findings.   1316 I rechecked the patient and explained findings.     Interventions:  1201 Antivert 50 mg PO  1201 NS 1 L IV    Disposition:  The patient was discharged to home.     Impression & Plan     Medical Decision Making:  Supa Garcia is a 77 year old male who presents for evaluation of near-syncope.  He did not have actual syncope.  The differential for near-syncope is broad and includes etiologies such as cardiac arrythmia, ACS, aortic stenosis, HOCM, PE, orthostatic hypotension, drugs, situational, carotid hypersensitivity, seizure, TIA, stroke, vasovagal.  There are no signs of a concerning etiology for near- syncope at this point.  Symptoms may also be slightly vertiginous.  This vertigo is likely more consistent with a  peripheral source.    There is no family history of sudden death, no chest pain, no seizure activity or post-ictal period, no murmur, and no signs of orthostasis in the ED, no focal neurologic symptoms, and no complaints of concerning headache.  The workup in the ED is negative and the physical exam is re-assurring.  Outpatient referral to neurology is therefore indicated.      Diagnosis:    ICD-10-CM    1. Dizziness  R42         Scribe Disclosure:  I, Abram Tariq, am serving as a scribe at 11:01 AM on 10/15/2022 to document services personally performed by Ronnie Ivory MD based on my observations and the provider's statements to me.        Ronnie Ivory MD  10/15/22 [id number]

## 2022-11-18 ENCOUNTER — TRANSFERRED RECORDS (OUTPATIENT)
Dept: HEALTH INFORMATION MANAGEMENT | Facility: CLINIC | Age: 77
End: 2022-11-18

## 2023-02-15 ENCOUNTER — TELEPHONE (OUTPATIENT)
Dept: FAMILY MEDICINE | Facility: CLINIC | Age: 78
End: 2023-02-15

## 2023-02-15 ENCOUNTER — OFFICE VISIT (OUTPATIENT)
Dept: FAMILY MEDICINE | Facility: CLINIC | Age: 78
End: 2023-02-15
Payer: MEDICARE

## 2023-02-15 VITALS
HEART RATE: 64 BPM | RESPIRATION RATE: 18 BRPM | WEIGHT: 207 LBS | OXYGEN SATURATION: 93 % | TEMPERATURE: 97.6 F | SYSTOLIC BLOOD PRESSURE: 115 MMHG | BODY MASS INDEX: 28.98 KG/M2 | HEIGHT: 71 IN | DIASTOLIC BLOOD PRESSURE: 81 MMHG

## 2023-02-15 DIAGNOSIS — Z95.0 CARDIAC PACEMAKER IN SITU: ICD-10-CM

## 2023-02-15 DIAGNOSIS — Z76.89 ENCOUNTER TO ESTABLISH CARE: Primary | ICD-10-CM

## 2023-02-15 DIAGNOSIS — I48.91 ATRIAL FIBRILLATION, UNSPECIFIED TYPE (H): ICD-10-CM

## 2023-02-15 PROBLEM — I70.91 GENERALIZED ATHEROSCLEROSIS: Status: ACTIVE | Noted: 2019-09-25

## 2023-02-15 PROBLEM — M19.012 OSTEOARTHRITIS OF LEFT GLENOHUMERAL JOINT: Status: ACTIVE | Noted: 2023-02-15

## 2023-02-15 PROBLEM — I44.2 COMPLETE ATRIOVENTRICULAR BLOCK (H): Status: ACTIVE | Noted: 2020-06-01

## 2023-02-15 PROBLEM — C61 PRIMARY MALIGNANT NEOPLASM OF PROSTATE (H): Status: ACTIVE | Noted: 2021-01-07

## 2023-02-15 PROBLEM — G89.29 CHRONIC LEFT SHOULDER PAIN: Status: ACTIVE | Noted: 2021-09-21

## 2023-02-15 PROBLEM — M25.512 CHRONIC LEFT SHOULDER PAIN: Status: ACTIVE | Noted: 2021-09-21

## 2023-02-15 PROCEDURE — 99214 OFFICE O/P EST MOD 30 MIN: CPT | Performed by: PHYSICIAN ASSISTANT

## 2023-02-15 RX ORDER — APIXABAN 5 MG (74)
5 KIT ORAL 2 TIMES DAILY
Qty: 60 EACH | Refills: 1 | Status: SHIPPED | OUTPATIENT
Start: 2023-02-15

## 2023-02-15 RX ORDER — MULTIVITAMIN
1 TABLET ORAL DAILY
COMMUNITY
Start: 2022-09-28

## 2023-02-15 RX ORDER — METOPROLOL SUCCINATE 25 MG/1
25 TABLET, EXTENDED RELEASE ORAL 2 TIMES DAILY
Qty: 60 TABLET | Refills: 1 | Status: SHIPPED | OUTPATIENT
Start: 2023-02-15

## 2023-02-15 ASSESSMENT — PAIN SCALES - GENERAL: PAINLEVEL: NO PAIN (0)

## 2023-02-15 NOTE — TELEPHONE ENCOUNTER
Defer to cardiology orders! Can disregard mine. Patient had made it seem like cardiology hadn't placed any orders yet. Thanks.

## 2023-02-15 NOTE — TELEPHONE ENCOUNTER
Writer called and spoke with Gloria (pharmacist), reviewed PCP instructions below, Gloria appreciative of callback and states she will defer to cardiology orders.    No further questions or concerns at this time.    Signing encounter.    Gerald Austin RN  [facility]

## 2023-02-15 NOTE — TELEPHONE ENCOUNTER
Gloria, Pharmacist called to clarify medication orders;    Pt was seen by cardiology last week and medications were sent to the pharmacy. Pt has not picked them up yet.Pharmacist asking for clarification which orders to fill;    -metoprolol succinate vs titrate   -Eliquis starter pack? Vs BID. Eliquis starter pack usually has a bolus.     Can we leave a detailed message on this number? YES  Phone number patient can be reached at: Other phone number: [facility] [phone]    Carito Cervantes RN  [facility] Triage

## 2023-02-15 NOTE — PROGRESS NOTES
"Assessment & Plan     Encounter to establish care  Plan for AWV upcoming to discuss other health maintenance topics.     Atrial fibrillation, unspecified type (H)  Cardiac pacemaker in situ  - metoprolol succinate ER (TOPROL XL) 25 MG 24 hr tablet  Dispense: 60 tablet; Refill: 1  - Apixaban Starter Pack (ELIQUIS DVT/PE STARTER PACK) 5 MG TBPK  Dispense: 60 each; Refill: 1    Reviewed note from Cardiology. Lengthy discussion in regards to atrial fibrillation amd both medications involved. By the end of our convo they both seemed to have a better understanding of the disease and the disease process along with the risk that the disease carries.  Discussed both metoprolol and Eliquis.  I suspect that given he is a healthy 77-year-old who is still very active and is without fall risk that the risks of being not anticoagulated are greater than the potential for falls.  This was explained to the patient and his wife.  Agreeable to the plan.  Medications sent to the pharmacy. Follow-up with Cards.    30 minutes spent on the date of the encounter doing chart review, review of test results, interpretation of tests, patient visit and documentation        BMI:   Estimated body mass index is 29.28 kg/m  as calculated from the following:    Height as of this encounter: 1.791 m (5' 10.5\").    Weight as of this encounter: 93.9 kg (207 lb).   Weight management plan: Discussed healthy diet and exercise guidelines      Return in about 3 months (around 5/15/2023) for Routine preventive, with me.    The likelihood of other entities in the differential is insufficient to justify any further testing for them at this time. This was explained to the patient. The patient was advised that persistent or worsening symptoms would require further evaluation. Patient advised to call the office and if unable to reach to go to the emergency room if they develop any new or worsening symptoms. Expressed understanding and agreement with above stated " "plan.     PK Gipson [facility]    Subjective   Supa Garcia is a 77 year old male presenting for the following health issues:  Patient presents with:  Heart Problem: Discuss A-fib  Results: Discuss echo    Former patient of Dr. Anatoliy Rivers.  Here today with his wife for evaluation and to establish care.  Children and grandchildren now living in the [facility] so they moved here from [place].  Graduate of [employer]. Daughter works in functional medicine.     History of pacemaker due to complete heart block.  Receives his care at [facility].  Of note recent device check revealed 3 episodes of approximately total of 5 hours of A-fib.  Recommended by cardiology to begin beta-blocker and anticoagulate given elevated [id number] score. Reviewed note.  Wishes to discuss options and here more about Afib and these medications.     Active with exercising biking/weights.  No exercise intolerance.    Review of Systems   Constitutional, HEENT, cardiovascular, pulmonary, GI, , musculoskeletal, neuro, skin, endocrine and psych systems are negative, except as otherwise noted.      Objective    /81 (BP Location: Right arm, Patient Position: Sitting, Cuff Size: Adult Large)   Pulse 64   Temp 97.6  F (36.4  C) (Oral)   Resp 18   Ht 1.791 m (5' 10.5\")   Wt 93.9 kg (207 lb)   SpO2 93%   BMI 29.28 kg/m    5' 10.5\"  207 lbs 0 oz    Allergies   Allergen Reactions     Bee Venom      Current Outpatient Medications   Medication Sig Dispense Refill     Apixaban Starter Pack (ELIQUIS DVT/PE STARTER PACK) 5 MG TBPK Take 5 mg by mouth 2 times daily 60 each 1     EPINEPHrine (ANY BX GENERIC EQUIV) 0.3 MG/0.3ML injection 2-pack INJECT INTRAMUSCULARLY AS DIRECTED FOR ALLERGIC REACTION       fish oil-omega-3 fatty acids 1000 MG capsule Take 1 g by mouth daily       metoprolol succinate ER (TOPROL XL) 25 MG 24 hr tablet Take 1 tablet (25 mg) by mouth 2 times daily 60 tablet 1     " Multiple Vitamin (ONE-A-DAY ESSENTIAL) TABS Take 1 tablet by mouth daily       multivitamin (OCUVITE) TABS tablet Take 1 tablet by mouth daily       NEW MED 1 capsule       Vitamin D-Vitamin K (VITAMIN K2-VITAMIN D3 PO) Take by mouth daily       Past Medical History:   Diagnosis Date     Allergic state      Complete heart block (H)     s/p pacer     Elevated prostate specific antigen (PSA) 12/2020     High coronary artery calcium score      Hyperlipidemia LDL goal <130      Hypothyroidism      Prostate cancer (H)      Syncope      Past Surgical History:   Procedure Laterality Date     HERNIA REPAIR         Physical Exam   GENERAL: healthy, alert and no distress  EYES: Eyes grossly normal to inspection, PERRL and conjunctivae and sclerae normal  NECK: no adenopathy, no asymmetry, masses, or scars and thyroid normal to palpation  RESP: lungs clear to auscultation - no rales, rhonchi or wheezes  CV: regular rate and rhythm, normal S1 S2, no S3 or S4, no murmur, click or rub, no peripheral edema and peripheral pulses strong  MS: no gross musculoskeletal defects noted, no edema  SKIN: no suspicious lesions or rashes  NEURO: Normal strength and tone, mentation intact and speech normal  PSYCH: mentation appears normal, affect normal/bright

## 2023-03-02 ENCOUNTER — OFFICE VISIT (OUTPATIENT)
Dept: FAMILY MEDICINE | Facility: CLINIC | Age: 78
End: 2023-03-02
Payer: MEDICARE

## 2023-03-02 VITALS
SYSTOLIC BLOOD PRESSURE: 114 MMHG | WEIGHT: 207.1 LBS | BODY MASS INDEX: 29.65 KG/M2 | TEMPERATURE: 98 F | OXYGEN SATURATION: 97 % | DIASTOLIC BLOOD PRESSURE: 72 MMHG | HEART RATE: 83 BPM | RESPIRATION RATE: 24 BRPM | HEIGHT: 70 IN

## 2023-03-02 DIAGNOSIS — R68.82 DECREASED LIBIDO: ICD-10-CM

## 2023-03-02 DIAGNOSIS — I48.91 ATRIAL FIBRILLATION, UNSPECIFIED TYPE (H): ICD-10-CM

## 2023-03-02 DIAGNOSIS — Z13.220 SCREENING FOR HYPERLIPIDEMIA: ICD-10-CM

## 2023-03-02 DIAGNOSIS — Z11.59 NEED FOR HEPATITIS C SCREENING TEST: ICD-10-CM

## 2023-03-02 DIAGNOSIS — I70.91 GENERALIZED ATHEROSCLEROSIS: ICD-10-CM

## 2023-03-02 DIAGNOSIS — Z00.00 ENCOUNTER FOR MEDICARE ANNUAL WELLNESS EXAM: Primary | ICD-10-CM

## 2023-03-02 PROCEDURE — G0439 PPPS, SUBSEQ VISIT: HCPCS | Performed by: PHYSICIAN ASSISTANT

## 2023-03-02 PROCEDURE — 99214 OFFICE O/P EST MOD 30 MIN: CPT | Mod: 25 | Performed by: PHYSICIAN ASSISTANT

## 2023-03-02 ASSESSMENT — ENCOUNTER SYMPTOMS
FREQUENCY: 0
NAUSEA: 0
HEADACHES: 0
COUGH: 0
SHORTNESS OF BREATH: 0
FEVER: 0
NERVOUS/ANXIOUS: 0
DYSURIA: 0
CHILLS: 0
MYALGIAS: 0
DIARRHEA: 0
ABDOMINAL PAIN: 0
CONSTIPATION: 0
PARESTHESIAS: 0
HEMATOCHEZIA: 0
WEAKNESS: 0
HEARTBURN: 0
JOINT SWELLING: 0
HEMATURIA: 0
PALPITATIONS: 0
SORE THROAT: 0
EYE PAIN: 0
ARTHRALGIAS: 0
DIZZINESS: 0

## 2023-03-02 ASSESSMENT — PAIN SCALES - GENERAL: PAINLEVEL: NO PAIN (0)

## 2023-03-02 ASSESSMENT — ACTIVITIES OF DAILY LIVING (ADL): CURRENT_FUNCTION: NO ASSISTANCE NEEDED

## 2023-03-02 NOTE — PROGRESS NOTES
"SUBJECTIVE:   Supa is a 77 year old who presents for Preventive Visit.  Patient has been advised of split billing requirements and indicates understanding: Yes  Are you in the first 12 months of your Medicare coverage?  No    Here today for his annual wellness visit.  Overall he is doing well.  Recent diagnosis of A-fib.  Started metoprolol as well as Eliquis over the past week.  Upcoming appointment with cardiology.  Tolerating medications well.  Does see eye doctor and dentist.  Follows with [place] [facility] for history of prostate cancer.  Underwent radiation and med tx. experiences approximately 3 nighttime awakenings for urinary frequency.  Does note decrease libido.  Questions about his testosterone levels.    Staying active.  Sees physical therapy.    Healthy Habits:     In general, how would you rate your overall health?  Good    Frequency of exercise:  4-5 days/week    Duration of exercise:  45-60 minutes    Do you usually eat at least 4 servings of fruit and vegetables a day, include whole grains    & fiber and avoid regularly eating high fat or \"junk\" foods?  Yes    Taking medications regularly:  Yes    Medication side effects:  None    Ability to successfully perform activities of daily living:  No assistance needed    Home Safety:  No safety concerns identified    Hearing Impairment:  Difficulty following a conversation in a noisy restaurant or crowded room and find that men's voices are easier to understand than woman's    In the past 6 months, have you been bothered by leaking of urine?  No    In general, how would you rate your overall mental or emotional health?  Excellent      PHQ-2 Total Score: 0    Additional concerns today:  Yes      Have you ever done Advance Care Planning? (For example, a Health Directive, POLST, or a discussion with a medical provider or your loved ones about your wishes): Yes, advance care planning is on file.      Fall risk  Fallen 2 or more times in the past year?: " No  Any fall with injury in the past year?: No  click delete button to remove this line now  Cognitive Screening   1) Repeat 3 items (Leader, Season, Table)    2) Clock draw: NORMAL  3) 3 item recall: }Recalls 3 objects  Results: NORMAL clock, 1-2 items recalled: COGNITIVE IMPAIRMENT LESS LIKELY    Mini-CogTM Copyright COBY Sánchez. Licensed by the author for use in [facility]; reprinted with permission (hannah@[facility]). All rights reserved.      Do you have sleep apnea, excessive snoring or daytime drowsiness?: no    Reviewed and updated as needed this visit by clinical staff   Tobacco  Allergies  Meds  Problems  Med Hx  Surg Hx  Fam Hx  Soc   Hx        Reviewed and updated as needed this visit by Provider   Tobacco  Allergies  Meds  Problems  Med Hx  Surg Hx  Fam Hx  Soc   Hx       Social History     Tobacco Use     Smoking status: Never     Smokeless tobacco: Never   Substance Use Topics     Alcohol use: Yes     Alcohol/week: 3.0 standard drinks     Types: 3 Standard drinks or equivalent per week     Comment: social         Alcohol Use 3/2/2023   Prescreen: >3 drinks/day or >7 drinks/week? No     Current providers sharing in care for this patient include:   Patient Care Team:  Spencer Carranza PA-C as PCP - General (Physician Assistant - Medical)  Patricia Jewell PA-C as Assigned Neuroscience Provider  Spencer Carranza PA-C as Assigned PCP    The following health maintenance items are reviewed in Epic and correct as of today:  Health Maintenance   Topic Date Due     HEPATITIS C SCREENING  Never done     ZOSTER IMMUNIZATION (1 of 2) Never done     Pneumococcal Vaccine: 65+ Years (2 - PCV) 05/27/2009     COVID-19 Vaccine (4 - Booster for Pfizer series) 12/29/2021     INFLUENZA VACCINE (1) 09/01/2022     MEDICARE ANNUAL WELLNESS VISIT  02/24/2023     ANNUAL REVIEW OF HM ORDERS  03/02/2024     FALL RISK ASSESSMENT  03/02/2024     LIPID  02/24/2027     ADVANCE CARE PLANNING   03/02/2028     DTAP/TDAP/TD IMMUNIZATION (6 - Td or Tdap) 01/01/2030     PHQ-2 (once per calendar year)  Completed     IPV IMMUNIZATION  Aged Out     MENINGITIS IMMUNIZATION  Aged Out     COLORECTAL CANCER SCREENING  Discontinued     Lab work is in process  Labs reviewed in EPIC  BP Readings from Last 3 Encounters:   03/02/23 114/72   02/15/23 115/81   10/15/22 (!) 160/77    Wt Readings from Last 3 Encounters:   03/02/23 93.9 kg (207 lb 1.6 oz)   02/15/23 93.9 kg (207 lb)   04/01/22 85.7 kg (189 lb)                  Patient Active Problem List   Diagnosis     Syncope and collapse     Bifascicular block     Complete atrioventricular block (H)     Hypothyroidism     Allergy to insect stings     Actinic keratosis     Cardiac pacemaker in situ     Chronic left shoulder pain     Disorder of anterior pituitary (H)     Dupuytren's disease of palm     Enlarged prostate     Generalized atherosclerosis     Hypercholesterolemia     Osteoarthritis of left glenohumeral joint     Primary malignant neoplasm of prostate (H)     Past Surgical History:   Procedure Laterality Date     HERNIA REPAIR         Social History     Tobacco Use     Smoking status: Never     Smokeless tobacco: Never   Substance Use Topics     Alcohol use: Yes     Alcohol/week: 3.0 standard drinks     Types: 3 Standard drinks or equivalent per week     Comment: social     Family History   Problem Relation Age of Onset     Hyperlipidemia Father      Coronary Artery Disease Father      Coronary Artery Disease Brother          Current Outpatient Medications   Medication Sig Dispense Refill     Apixaban Starter Pack (ELIQUIS DVT/PE STARTER PACK) 5 MG TBPK Take 5 mg by mouth 2 times daily 60 each 1     EPINEPHrine (ANY BX GENERIC EQUIV) 0.3 MG/0.3ML injection 2-pack INJECT INTRAMUSCULARLY AS DIRECTED FOR ALLERGIC REACTION       fish oil-omega-3 fatty acids 1000 MG capsule Take 1 g by mouth daily       metoprolol succinate ER (TOPROL XL) 25 MG 24 hr tablet Take 1  "tablet (25 mg) by mouth 2 times daily 60 tablet 1     Multiple Vitamin (ONE-A-DAY ESSENTIAL) TABS Take 1 tablet by mouth daily       multivitamin (OCUVITE) TABS tablet Take 1 tablet by mouth daily       NEW MED 1 capsule       Vitamin D-Vitamin K (VITAMIN K2-VITAMIN D3 PO) Take by mouth daily       Allergies   Allergen Reactions     Bee Venom      Recent Labs   Lab Test 10/15/22  1103 02/24/22  1041 04/06/21  1042 01/29/21  1040 12/09/20  1054 02/05/19  1505   LDL  --  176*  --   --  165* 145*   HDL  --  46  --   --  51 57   TRIG  --  183*  --   --  197* 140   ALT 28 28  --   --  33  --    CR 0.68 0.86  --   --  0.92  --    GFRESTIMATED >90 89 82 72 80  --    GFRESTBLACK  --   --  >90 88 >90  --    POTASSIUM 3.9 3.9  --   --  4.4  --    TSH  --  0.94  --   --  1.42  --           Review of Systems   Constitutional: Negative for chills and fever.   HENT: Negative for congestion, ear pain, hearing loss and sore throat.    Eyes: Negative for pain and visual disturbance.   Respiratory: Negative for cough and shortness of breath.    Cardiovascular: Negative for chest pain, palpitations and peripheral edema.   Gastrointestinal: Negative for abdominal pain, constipation, diarrhea, heartburn, hematochezia and nausea.   Genitourinary: Positive for impotence. Negative for dysuria, frequency, genital sores, hematuria, penile discharge and urgency.   Musculoskeletal: Negative for arthralgias, joint swelling and myalgias.   Skin: Negative for rash.   Neurological: Negative for dizziness, weakness, headaches and paresthesias.   Psychiatric/Behavioral: Negative for mood changes. The patient is not nervous/anxious.        OBJECTIVE:   /72 (BP Location: Left arm, Patient Position: Sitting, Cuff Size: Adult Regular)   Pulse 83   Temp 98  F (36.7  C) (Tympanic)   Resp 24   Ht 1.778 m (5' 10\")   Wt 93.9 kg (207 lb 1.6 oz)   SpO2 97%   BMI 29.72 kg/m   Estimated body mass index is 29.72 kg/m  as calculated from the " "following:    Height as of this encounter: 1.778 m (5' 10\").    Weight as of this encounter: 93.9 kg (207 lb 1.6 oz).  Physical Exam  GENERAL: healthy, alert and no distress  EYES: Eyes grossly normal to inspection, PERRL and conjunctivae and sclerae normal  HENT: ear canals and TM's normal, nose and mouth without ulcers or lesions  NECK: no adenopathy, no asymmetry, masses, or scars and thyroid normal to palpation  RESP: lungs clear to auscultation - no rales, rhonchi or wheezes  CV: regular rate and rhythm, normal S1 S2, no S3 or S4, no murmur, click or rub, no peripheral edema and peripheral pulses strong  ABDOMEN: soft, nontender, no hepatosplenomegaly, no masses and bowel sounds normal  MS: no gross musculoskeletal defects noted, no edema  SKIN: no suspicious lesions or rashes  NEURO: Normal strength and tone, mentation intact and speech normal  PSYCH: mentation appears normal, affect normal/bright      ASSESSMENT / PLAN:   Supa was seen today for physical.    Diagnoses and all orders for this visit:    Encounter for Medicare annual wellness exam    Declines immunizations at today's visit.  One-time screening for hep C. Continue healthy and active lifestyle. Continue follow-up with specialists. AWV 1 year. Follow-up this summer.     -     REVIEW OF HEALTH MAINTENANCE PROTOCOL ORDERS    Atrial fibrillation, unspecified type (H)    Check basic blood counts today as well as a CMP.  Had thyroid function recently.    -     CBC with platelets and differential; Future  -     Comprehensive metabolic panel (BMP + Alb, Alk Phos, ALT, AST, Total. Bili, TP); Future    Generalized atherosclerosis  Screening for hyperlipidemia    Lipid panel ordered.  Reviewed previous lipid panel.  Did discuss statin therapy which she declines at this time.    -     Lipid panel reflex to direct LDL Fasting; Future    Need for hepatitis C screening test  -     Hepatitis C Screen Reflex to HCV RNA Quant and Genotype; Future    Decreased " libido    Suspect due to prostate cancer treatment/radiation + age.     -     Testosterone Free and Total; Future      Patient has been advised of split billing requirements and indicates understanding: Yes      COUNSELING:  Reviewed preventive health counseling, as reflected in patient instructions       Regular exercise       Healthy diet/nutrition       Vision screening       Dental care       Bladder control       Fall risk prevention       Hepatitis C screening      He reports that he has never smoked. He has never used smokeless tobacco.      Appropriate preventive services were discussed with this patient, including applicable screening as appropriate for cardiovascular disease, diabetes, osteopenia/osteoporosis, and glaucoma.  As appropriate for age/gender, discussed screening for colorectal cancer, prostate cancer, breast cancer, and cervical cancer. Checklist reviewing preventive services available has been given to the patient.    Reviewed patients plan of care and provided an AVS. The Basic Care Plan (routine screening as documented in Health Maintenance) for Supa meets the Care Plan requirement. This Care Plan has been established and reviewed with the Patient.          The likelihood of other entities in the differential is insufficient to justify any further testing for them at this time. This was explained to the patient. The patient was advised that persistent or worsening symptoms would require further evaluation. Patient advised to call the office and if unable to reach to go to the emergency room if they develop any new or worsening symptoms. Expressed understanding and agreement with above stated plan.     PK Gipson [facility]

## 2023-03-02 NOTE — PATIENT INSTRUCTIONS
Patient Education   Personalized Prevention Plan  You are due for the preventive services outlined below.  Your care team is available to assist you in scheduling these services.  If you have already completed any of these items, please share that information with your care team to update in your medical record.  Health Maintenance Due   Topic Date Due     ANNUAL REVIEW OF  ORDERS  Never done     Hepatitis C Screening  Never done     Zoster (Shingles) Vaccine (1 of 2) Never done     Pneumococcal Vaccine (2 - PCV) 05/27/2009     COVID-19 Vaccine (4 - Booster for Pfizer series) 12/29/2021     Flu Vaccine (1) 09/01/2022     Annual Wellness Visit  02/24/2023       Signs of Hearing Loss      Hearing much better with one ear can be a sign of hearing loss.   Hearing loss is a problem shared by many people. In fact, it is one of the most common health problems, particularly as people age. Most people age 65 and older have some hearing loss. By age 80, almost everyone does. Hearing loss often occurs slowly over the years. So you may not realize your hearing has gotten worse.  Have your hearing checked  Call your healthcare provider if you:    Have to strain to hear normal conversation    Have to watch other people s faces very carefully to follow what they re saying    Need to ask people to repeat what they ve said    Often misunderstand what people are saying    Turn the volume of the television or radio up so high that others complain    Feel that people are mumbling when they re talking to you    Find that the effort to hear leaves you feeling tired and irritated    Notice, when using the phone, that you hear better with one ear than the other  [employer] last reviewed this educational content on 1/1/2020 2000-2021 [employer]. All rights reserved. This information is not intended as a substitute for professional medical care. Always follow your healthcare professional's instructions.

## 2023-03-09 ENCOUNTER — LAB (OUTPATIENT)
Dept: LAB | Facility: CLINIC | Age: 78
End: 2023-03-09
Payer: MEDICARE

## 2023-03-09 DIAGNOSIS — Z11.59 NEED FOR HEPATITIS C SCREENING TEST: ICD-10-CM

## 2023-03-09 DIAGNOSIS — I70.91 GENERALIZED ATHEROSCLEROSIS: ICD-10-CM

## 2023-03-09 DIAGNOSIS — Z13.220 SCREENING FOR HYPERLIPIDEMIA: ICD-10-CM

## 2023-03-09 DIAGNOSIS — R68.82 DECREASED LIBIDO: ICD-10-CM

## 2023-03-09 DIAGNOSIS — I48.91 ATRIAL FIBRILLATION, UNSPECIFIED TYPE (H): ICD-10-CM

## 2023-03-09 LAB
ALBUMIN SERPL BCG-MCNC: 4.5 G/DL (ref 3.5–5.2)
ALP SERPL-CCNC: 73 U/L (ref 40–129)
ALT SERPL W P-5'-P-CCNC: 28 U/L (ref 10–50)
ANION GAP SERPL CALCULATED.3IONS-SCNC: 13 MMOL/L (ref 7–15)
AST SERPL W P-5'-P-CCNC: 28 U/L (ref 10–50)
BASOPHILS # BLD AUTO: 0 10E3/UL (ref 0–0.2)
BASOPHILS NFR BLD AUTO: 1 %
BILIRUB SERPL-MCNC: 0.4 MG/DL
BUN SERPL-MCNC: 23.9 MG/DL (ref 8–23)
CALCIUM SERPL-MCNC: 9.4 MG/DL (ref 8.8–10.2)
CHLORIDE SERPL-SCNC: 106 MMOL/L (ref 98–107)
CHOLEST SERPL-MCNC: 260 MG/DL
CREAT SERPL-MCNC: 0.95 MG/DL (ref 0.67–1.17)
DEPRECATED HCO3 PLAS-SCNC: 24 MMOL/L (ref 22–29)
EOSINOPHIL # BLD AUTO: 0.2 10E3/UL (ref 0–0.7)
EOSINOPHIL NFR BLD AUTO: 4 %
ERYTHROCYTE [DISTWIDTH] IN BLOOD BY AUTOMATED COUNT: 13 % (ref 10–15)
GFR SERPL CREATININE-BSD FRML MDRD: 81 ML/MIN/1.73M2
GLUCOSE SERPL-MCNC: 102 MG/DL (ref 70–99)
HCT VFR BLD AUTO: 39 % (ref 40–53)
HDLC SERPL-MCNC: 50 MG/DL
HGB BLD-MCNC: 13.6 G/DL (ref 13.3–17.7)
IMM GRANULOCYTES # BLD: 0 10E3/UL
IMM GRANULOCYTES NFR BLD: 0 %
LDLC SERPL CALC-MCNC: 168 MG/DL
LYMPHOCYTES # BLD AUTO: 0.9 10E3/UL (ref 0.8–5.3)
LYMPHOCYTES NFR BLD AUTO: 24 %
MCH RBC QN AUTO: 33 PG (ref 26.5–33)
MCHC RBC AUTO-ENTMCNC: 34.9 G/DL (ref 31.5–36.5)
MCV RBC AUTO: 95 FL (ref 78–100)
MONOCYTES # BLD AUTO: 0.3 10E3/UL (ref 0–1.3)
MONOCYTES NFR BLD AUTO: 8 %
NEUTROPHILS # BLD AUTO: 2.3 10E3/UL (ref 1.6–8.3)
NEUTROPHILS NFR BLD AUTO: 63 %
NONHDLC SERPL-MCNC: 210 MG/DL
PLATELET # BLD AUTO: 185 10E3/UL (ref 150–450)
POTASSIUM SERPL-SCNC: 4.3 MMOL/L (ref 3.4–5.3)
PROT SERPL-MCNC: 6.7 G/DL (ref 6.4–8.3)
RBC # BLD AUTO: 4.12 10E6/UL (ref 4.4–5.9)
SODIUM SERPL-SCNC: 143 MMOL/L (ref 136–145)
TRIGL SERPL-MCNC: 210 MG/DL
WBC # BLD AUTO: 3.7 10E3/UL (ref 4–11)

## 2023-03-09 PROCEDURE — 36415 COLL VENOUS BLD VENIPUNCTURE: CPT

## 2023-03-09 PROCEDURE — 85025 COMPLETE CBC W/AUTO DIFF WBC: CPT

## 2023-03-09 PROCEDURE — 84270 ASSAY OF SEX HORMONE GLOBUL: CPT

## 2023-03-09 PROCEDURE — 80053 COMPREHEN METABOLIC PANEL: CPT

## 2023-03-09 PROCEDURE — 80061 LIPID PANEL: CPT

## 2023-03-09 PROCEDURE — 86803 HEPATITIS C AB TEST: CPT

## 2023-03-09 PROCEDURE — 84403 ASSAY OF TOTAL TESTOSTERONE: CPT

## 2023-03-10 LAB — HCV AB SERPL QL IA: NONREACTIVE

## 2023-03-12 LAB — SHBG SERPL-SCNC: 37 NMOL/L (ref 11–80)

## 2023-03-13 LAB
TESTOST FREE SERPL-MCNC: 0.47 NG/DL
TESTOST SERPL-MCNC: 28 NG/DL (ref 240–950)

## 2023-03-13 NOTE — RESULT ENCOUNTER NOTE
Carrie Geiger,     Great seeing for your annual wellness visit. Overall labs look stable.     - Stable CBC (white blood cells, hemoglobin and platelets)    - Normal electrolytes, kidney function and liver enzymes     - Elevated cholesterol. Elevated risk for an adverse event - 27.4%. I would recommend a statin given this. Understand your hesitancy, let me know if this is something you wish to pursue.     The 10-year ASCVD risk score (Donita PRICE, et al., 2019) is: 27.4%    Values used to calculate the score:      Age: 77 years      Sex: Male      Is Non- : No      Diabetic: No      Tobacco smoker: No      Systolic Blood Pressure: 114 mmHg      Is BP treated: No      HDL Cholesterol: 50 mg/dL      Total Cholesterol: 260 mg/dL    - Lower testosterone from 4 years ago  as expected following radiation etc. You could reach out to your urology/onc for treatment options if you wish. I know we had discussed ED medication, let me know if you wish to pursue this.     Spencer Posadas PA-C  [facility]

## 2023-04-23 ENCOUNTER — HEALTH MAINTENANCE LETTER (OUTPATIENT)
Age: 78
End: 2023-04-23

## 2023-12-01 ENCOUNTER — TRANSFERRED RECORDS (OUTPATIENT)
Dept: HEALTH INFORMATION MANAGEMENT | Facility: CLINIC | Age: 78
End: 2023-12-01
Payer: MEDICARE

## 2024-03-08 ENCOUNTER — OFFICE VISIT (OUTPATIENT)
Dept: FAMILY MEDICINE | Facility: CLINIC | Age: 79
End: 2024-03-08
Payer: MEDICARE

## 2024-03-08 VITALS
WEIGHT: 206 LBS | HEART RATE: 67 BPM | TEMPERATURE: 97.7 F | SYSTOLIC BLOOD PRESSURE: 105 MMHG | RESPIRATION RATE: 20 BRPM | OXYGEN SATURATION: 97 % | HEIGHT: 70 IN | BODY MASS INDEX: 29.49 KG/M2 | DIASTOLIC BLOOD PRESSURE: 71 MMHG

## 2024-03-08 DIAGNOSIS — N40.0 ENLARGED PROSTATE: ICD-10-CM

## 2024-03-08 DIAGNOSIS — C61 PRIMARY MALIGNANT NEOPLASM OF PROSTATE (H): ICD-10-CM

## 2024-03-08 DIAGNOSIS — E78.00 HYPERCHOLESTEROLEMIA: ICD-10-CM

## 2024-03-08 DIAGNOSIS — Z12.5 ENCOUNTER FOR SCREENING FOR MALIGNANT NEOPLASM OF PROSTATE: ICD-10-CM

## 2024-03-08 DIAGNOSIS — I48.91 ATRIAL FIBRILLATION, UNSPECIFIED TYPE (H): ICD-10-CM

## 2024-03-08 DIAGNOSIS — Z00.00 ENCOUNTER FOR ANNUAL WELLNESS EXAM IN MEDICARE PATIENT: Primary | ICD-10-CM

## 2024-03-08 LAB
BASOPHILS # BLD AUTO: 0 10E3/UL (ref 0–0.2)
BASOPHILS NFR BLD AUTO: 1 %
EOSINOPHIL # BLD AUTO: 0.1 10E3/UL (ref 0–0.7)
EOSINOPHIL NFR BLD AUTO: 2 %
ERYTHROCYTE [DISTWIDTH] IN BLOOD BY AUTOMATED COUNT: 13 % (ref 10–15)
HCT VFR BLD AUTO: 37.8 % (ref 40–53)
HGB BLD-MCNC: 13.3 G/DL (ref 13.3–17.7)
IMM GRANULOCYTES # BLD: 0 10E3/UL
IMM GRANULOCYTES NFR BLD: 0 %
LYMPHOCYTES # BLD AUTO: 0.9 10E3/UL (ref 0.8–5.3)
LYMPHOCYTES NFR BLD AUTO: 16 %
MCH RBC QN AUTO: 33.6 PG (ref 26.5–33)
MCHC RBC AUTO-ENTMCNC: 35.2 G/DL (ref 31.5–36.5)
MCV RBC AUTO: 96 FL (ref 78–100)
MONOCYTES # BLD AUTO: 0.4 10E3/UL (ref 0–1.3)
MONOCYTES NFR BLD AUTO: 7 %
NEUTROPHILS # BLD AUTO: 3.9 10E3/UL (ref 1.6–8.3)
NEUTROPHILS NFR BLD AUTO: 73 %
PLATELET # BLD AUTO: 203 10E3/UL (ref 150–450)
RBC # BLD AUTO: 3.96 10E6/UL (ref 4.4–5.9)
WBC # BLD AUTO: 5.3 10E3/UL (ref 4–11)

## 2024-03-08 PROCEDURE — 85025 COMPLETE CBC W/AUTO DIFF WBC: CPT | Performed by: PHYSICIAN ASSISTANT

## 2024-03-08 PROCEDURE — 36415 COLL VENOUS BLD VENIPUNCTURE: CPT | Performed by: PHYSICIAN ASSISTANT

## 2024-03-08 PROCEDURE — G0103 PSA SCREENING: HCPCS | Performed by: PHYSICIAN ASSISTANT

## 2024-03-08 PROCEDURE — 80053 COMPREHEN METABOLIC PANEL: CPT | Performed by: PHYSICIAN ASSISTANT

## 2024-03-08 PROCEDURE — 83721 ASSAY OF BLOOD LIPOPROTEIN: CPT | Mod: 59 | Performed by: PHYSICIAN ASSISTANT

## 2024-03-08 PROCEDURE — G0439 PPPS, SUBSEQ VISIT: HCPCS | Performed by: PHYSICIAN ASSISTANT

## 2024-03-08 PROCEDURE — 80061 LIPID PANEL: CPT | Performed by: PHYSICIAN ASSISTANT

## 2024-03-08 PROCEDURE — 84443 ASSAY THYROID STIM HORMONE: CPT | Performed by: PHYSICIAN ASSISTANT

## 2024-03-08 PROCEDURE — 99213 OFFICE O/P EST LOW 20 MIN: CPT | Mod: 25 | Performed by: PHYSICIAN ASSISTANT

## 2024-03-08 RX ORDER — AMOXICILLIN 500 MG/1
CAPSULE ORAL
COMMUNITY
Start: 2023-05-23

## 2024-03-08 RX ORDER — RESPIRATORY SYNCYTIAL VIRUS VACCINE 120MCG/0.5
0.5 KIT INTRAMUSCULAR ONCE
Qty: 1 EACH | Refills: 0 | Status: CANCELLED | OUTPATIENT
Start: 2024-03-08 | End: 2024-03-08

## 2024-03-08 RX ORDER — METOPROLOL TARTRATE 25 MG/1
25 TABLET, FILM COATED ORAL
COMMUNITY
Start: 2024-03-02

## 2024-03-08 RX ORDER — OFLOXACIN 3 MG/ML
SOLUTION/ DROPS OPHTHALMIC
COMMUNITY
Start: 2024-02-16

## 2024-03-08 SDOH — HEALTH STABILITY: PHYSICAL HEALTH: ON AVERAGE, HOW MANY DAYS PER WEEK DO YOU ENGAGE IN MODERATE TO STRENUOUS EXERCISE (LIKE A BRISK WALK)?: 4 DAYS

## 2024-03-08 ASSESSMENT — PAIN SCALES - GENERAL: PAINLEVEL: NO PAIN (0)

## 2024-03-08 ASSESSMENT — SOCIAL DETERMINANTS OF HEALTH (SDOH): HOW OFTEN DO YOU GET TOGETHER WITH FRIENDS OR RELATIVES?: MORE THAN THREE TIMES A WEEK

## 2024-03-08 NOTE — LETTER
March 18, 2024      Supa Garcia  [address]  [facility] [address]            Hi Supa,     It does not appear you have viewed your lab result note online thru MY CHART so sending this letter.    Great seeing you last week!     -Stable blood counts. No signs of anemia.     -Your comprehensive metabolic panel which includes tests of liver function (ALT, AST, total bilirubin, alkaline phosphatase), kidney function (creatinine, BUN), electrolytes (sodium, potassium, calcium), and blood sugar (glucose) returned stable for you.     -TSH (thyroid stimulating hormone) level is normal which indicates normal circulating thyroid hormone levels.     -Your prostate specific antigen (PSA) test result returned normal.     -Cholesterol numbers continue to be elevated. This in the setting of a elevated coronary calcium score from 2019 definitely makes me consider a statin for you - have you ever been on one? This could be preventative. Let me know.     Let me know if you have any questions or concerns,     PK Gipson [facility]    Results for orders placed or performed in visit on 03/08/24   Lipid panel reflex to direct LDL Non-fasting     Status: Abnormal   Result Value Ref Range    Cholesterol 246 (H) <200 mg/dL    Triglycerides 481 (H) <150 mg/dL    Direct Measure HDL 41 >=40 mg/dL    LDL Cholesterol Calculated      Non HDL Cholesterol 205 (H) <130 mg/dL    Patient Fasting > 8hrs? No     Narrative    Cholesterol  Desirable:  <200 mg/dL    Triglycerides  Normal:  Less than 150 mg/dL  Borderline High:  150-199 mg/dL  High:  200-499 mg/dL  Very High:  Greater than or equal to 500 mg/dL    Direct Measure HDL  Female:  Greater than or equal to 50 mg/dL   Male:  Greater than or equal to 40 mg/dL    LDL Cholesterol  Desirable:  <100mg/dL  Above Desirable:  100-129 mg/dL   Borderline High:  130-159 mg/dL   High:  160-189 mg/dL   Very High:  >= 190 mg/dL    Non HDL  Cholesterol  Desirable:  130 mg/dL  Above Desirable:  130-159 mg/dL  Borderline High:  160-189 mg/dL  High:  190-219 mg/dL  Very High:  Greater than or equal to 220 mg/dL   Comprehensive metabolic panel (BMP + Alb, Alk Phos, ALT, AST, Total. Bili, TP)     Status: Abnormal   Result Value Ref Range    Sodium 141 135 - 145 mmol/L    Potassium 4.7 3.4 - 5.3 mmol/L    Carbon Dioxide (CO2) 27 22 - 29 mmol/L    Anion Gap 10 7 - 15 mmol/L    Urea Nitrogen 18.7 8.0 - 23.0 mg/dL    Creatinine 1.13 0.67 - 1.17 mg/dL    GFR Estimate 66 >60 mL/min/1.73m2    Calcium 9.7 8.8 - 10.2 mg/dL    Chloride 104 98 - 107 mmol/L    Glucose 121 (H) 70 - 99 mg/dL    Alkaline Phosphatase 78 40 - 150 U/L    AST 31 0 - 45 U/L    ALT 32 0 - 70 U/L    Protein Total 6.9 6.4 - 8.3 g/dL    Albumin 4.6 3.5 - 5.2 g/dL    Bilirubin Total 0.3 <=1.2 mg/dL   TSH with free T4 reflex     Status: Normal   Result Value Ref Range    TSH 1.28 0.30 - 4.20 uIU/mL   CBC with platelets and differential     Status: Abnormal   Result Value Ref Range    WBC Count 5.3 4.0 - 11.0 10e3/uL    RBC Count 3.96 (L) 4.40 - 5.90 10e6/uL    Hemoglobin 13.3 13.3 - 17.7 g/dL    Hematocrit 37.8 (L) 40.0 - 53.0 %    MCV 96 78 - 100 fL    MCH 33.6 (H) 26.5 - 33.0 pg    MCHC 35.2 31.5 - 36.5 g/dL    RDW 13.0 10.0 - 15.0 %    Platelet Count 203 150 - 450 10e3/uL    % Neutrophils 73 %    % Lymphocytes 16 %    % Monocytes 7 %    % Eosinophils 2 %    % Basophils 1 %    % Immature Granulocytes 0 %    Absolute Neutrophils 3.9 1.6 - 8.3 10e3/uL    Absolute Lymphocytes 0.9 0.8 - 5.3 10e3/uL    Absolute Monocytes 0.4 0.0 - 1.3 10e3/uL    Absolute Eosinophils 0.1 0.0 - 0.7 10e3/uL    Absolute Basophils 0.0 0.0 - 0.2 10e3/uL    Absolute Immature Granulocytes 0.0 <=0.4 10e3/uL   PSA, screen     Status: None   Result Value Ref Range    Prostate Specific Antigen Screen <0.01 ng/mL    Narrative    This result is obtained using the Roche Elecsys total PSA method on the janice [id number] immunoassay analyzer.  Results obtained with different assay methods or kits cannot be used interchangeably.   LDL cholesterol direct     Status: Abnormal   Result Value Ref Range    LDL Cholesterol Direct 157 (H) <100 mg/dL   CBC with platelets and differential     Status: Abnormal    Narrative    The following orders were created for panel order CBC with platelets and differential.  Procedure                               Abnormality         Status                     ---------                               -----------         ------                     CBC with platelets and d...[[id number]]  Abnormal            Final result                 Please view results for these tests on the individual orders.

## 2024-03-08 NOTE — PATIENT INSTRUCTIONS
Dr. Garcias or Dr. Smith    [facility]  [address] [address]  [phone]

## 2024-03-08 NOTE — PROGRESS NOTES
"Preventive Care Visit  [facility] KIM Carranza PA-C, Physician Assistant - Medical  Mar 8, 2024    Assessment & Plan     Encounter for annual wellness exam in Medicare patient  Annual labs ordered. Healthy diet and exercise reviewed. Recommended routine dental, eye, and skin screenings.  Provided him with recommendations for hand specialist.  Left hand dominant.  Declines immunizations today    - Lipid panel reflex to direct LDL Non-fasting  - CBC with platelets and differential  - Comprehensive metabolic panel (BMP + Alb, Alk Phos, ALT, AST, Total. Bili, TP)  - TSH with free T4 reflex  - Lipid panel reflex to direct LDL Non-fasting  - CBC with platelets and differential  - Comprehensive metabolic panel (BMP + Alb, Alk Phos, ALT, AST, Total. Bili, TP)  - TSH with free T4 reflex    Hypercholesterolemia  Atrial fibrillation, unspecified type (H)  Labs today.  Continue Eliquis and metoprolol.  Close follow-up with cardiology.  - CBC with platelets and differential  - Comprehensive metabolic panel (BMP + Alb, Alk Phos, ALT, AST, Total. Bili, TP)  - TSH with free T4 reflex  - Lipid panel reflex to direct LDL Non-fasting    Primary malignant neoplasm of prostate (H)  Continue close follow-up with [place] [facility]      Patient has been advised of split billing requirements and indicates understanding: Yes    30 minutes spent by me on the date of the encounter doing chart review, review of test results, interpretation of tests, patient visit, and documentation       BMI  Estimated body mass index is 29.56 kg/m  as calculated from the following:    Height as of this encounter: 1.778 m (5' 10\").    Weight as of this encounter: 93.4 kg (206 lb).   Weight management plan: Discussed healthy diet and exercise guidelines    Counseling  Appropriate preventive services were discussed with this patient, including applicable screening as appropriate for fall prevention, nutrition, physical activity, " Tobacco-use cessation, weight loss and cognition.  Checklist reviewing preventive services available has been given to the patient.  Reviewed patient's diet, addressing concerns and/or questions.   He is at risk for psychosocial distress and has been provided with information to reduce risk.       Thai Cowart is a very pleasant 78 year old, presenting for the following:    Here today for his annual wellness visit.    Overall doing well.  Staying active exercising at [employer] 3-4 times per week. Golf. Active with a [employer] class at the [place] [employer].  Enjoying going to his [employer] sporting events. [place].     Followed regularly by cardiology, [place] [facility] (history of prostate cancer), dermatology.  Sees eye doctor regularly.  Recovering from recent corneal abrasion    Taking Eliquis as well as metoprolol 12.5 mg twice daily    Physical        Health Care Directive  Patient has a Health Care Directive on file  Advance care planning document is on file and is current.        3/8/2024   General Health   How would you rate your overall physical health? Good   Feel stress (tense, anxious, or unable to sleep) Only a little   (!) STRESS CONCERN      3/8/2024   Nutrition   Diet: Regular (no restrictions)         3/8/2024   Exercise   Days per week of moderate/strenous exercise 4 days         3/8/2024   Social Factors   Frequency of gathering with friends or relatives More than three times a week   Worry food won't last until get money to buy more No   Food not last or not have enough money for food? No   Do you have housing?  Yes   Are you worried about losing your housing? No   Lack of transportation? No   Unable to get utilities (heat,electricity)? No         3/8/2024   Activities of Daily Living- Home Safety   Needs help with the following daily activites None of the above   Safety concerns in the home None of the above         3/8/2024   Dental   Dentist two times every year? Yes          3/8/2024   Hearing Screening   Hearing concerns? None of the above         3/8/2024   Driving Risk Screening   Patient/family members have concerns about driving No         3/8/2024   General Alertness/Fatigue Screening   Have you been more tired than usual lately? No         3/8/2024   Urinary Incontinence Screening   Bothered by leaking urine in past 6 months No            Today's PHQ-2 Score:       3/8/2024     1:06 PM   PHQ-2 ( 1999 [employer])   Q1: Little interest or pleasure in doing things 0   Q2: Feeling down, depressed or hopeless 0   PHQ-2 Score 0   Q1: Little interest or pleasure in doing things Not at all   Q2: Feeling down, depressed or hopeless Not at all   PHQ-2 Score 0           3/8/2024   Substance Use   Alcohol more than 3/day or more than 7/wk No   Do you have a current opioid prescription? No   How severe/bad is pain from 1 to 10? 0/10 (No Pain)   Do you use any other substances recreationally? No     Social History     Tobacco Use    Smoking status: Never    Smokeless tobacco: Never   Vaping Use    Vaping Use: Never used   Substance Use Topics    Alcohol use: Yes     Alcohol/week: 3.0 standard drinks of alcohol     Types: 3 Standard drinks or equivalent per week     Comment: social    Drug use: No               Reviewed and updated as needed this visit by Provider   Tobacco  Allergies  Meds   Med Hx  Surg Hx  Fam Hx            Past Medical History:   Diagnosis Date    Allergic state     Complete heart block (H)     s/p pacer    Elevated prostate specific antigen (PSA) 12/2020    High coronary artery calcium score     Hyperlipidemia LDL goal <130     Hypothyroidism     Prostate cancer (H)     Syncope      Past Surgical History:   Procedure Laterality Date    HERNIA REPAIR       OB History   No obstetric history on file.     Lab work is in process  Labs reviewed in EPIC  BP Readings from Last 3 Encounters:   03/08/24 105/71   03/02/23 114/72   02/15/23 115/81    Wt Readings from Last 3  Encounters:   03/08/24 93.4 kg (206 lb)   03/02/23 93.9 kg (207 lb 1.6 oz)   02/15/23 93.9 kg (207 lb)                  Patient Active Problem List   Diagnosis    Syncope and collapse    Bifascicular block    Complete atrioventricular block (H)    Hypothyroidism    Allergy to insect stings    Actinic keratosis    Cardiac pacemaker in situ    Chronic left shoulder pain    Disorder of anterior pituitary (H24)    Dupuytren's disease of palm    Enlarged prostate    Generalized atherosclerosis    Hypercholesterolemia    Osteoarthritis of left glenohumeral joint    Primary malignant neoplasm of prostate (H)     Past Surgical History:   Procedure Laterality Date    HERNIA REPAIR         Social History     Tobacco Use    Smoking status: Never    Smokeless tobacco: Never   Substance Use Topics    Alcohol use: Yes     Alcohol/week: 3.0 standard drinks of alcohol     Types: 3 Standard drinks or equivalent per week     Comment: social     Family History   Problem Relation Age of Onset    Hyperlipidemia Father     Coronary Artery Disease Father     Coronary Artery Disease Brother          Current Outpatient Medications   Medication Sig Dispense Refill    amoxicillin (AMOXIL) 500 MG capsule Take four capsules (2000mg) 60 minutes prior to dental appointment.      Apixaban Starter Pack (ELIQUIS DVT/PE STARTER PACK) 5 MG TBPK Take 5 mg by mouth 2 times daily 60 each 1    EPINEPHrine (ANY BX GENERIC EQUIV) 0.3 MG/0.3ML injection 2-pack INJECT INTRAMUSCULARLY AS DIRECTED FOR ALLERGIC REACTION      fish oil-omega-3 fatty acids 1000 MG capsule Take 1 g by mouth daily      metoprolol succinate ER (TOPROL XL) 25 MG 24 hr tablet Take 1 tablet (25 mg) by mouth 2 times daily 60 tablet 1    metoprolol tartrate (LOPRESSOR) 25 MG tablet 25 mg      Multiple Vitamin (ONE-A-DAY ESSENTIAL) TABS Take 1 tablet by mouth daily      multivitamin (OCUVITE) TABS tablet Take 1 tablet by mouth daily      NEW MED 1 capsule      ofloxacin (OCUFLOX) 0.3 %  "ophthalmic solution       Vitamin D-Vitamin K (VITAMIN K2-VITAMIN D3 PO) Take by mouth daily       Allergies   Allergen Reactions    Bee Venom      Current providers sharing in care for this patient include:  Patient Care Team:  Spencer Carranza PA-C as PCP - General (Physician Assistant - Medical)  Spencer Carranza PA-C as Assigned PCP    The following health maintenance items are reviewed in Epic and correct as of today:  Health Maintenance   Topic Date Due    ZOSTER IMMUNIZATION (1 of 2) Never done    RSV VACCINE (Pregnancy & 60+) (1 - 1-dose 60+ series) Never done    Pneumococcal Vaccine: 65+ Years (2 of 2 - PCV) 05/27/2009    INFLUENZA VACCINE (1) 09/01/2023    COVID-19 Vaccine (4 - 2023-24 season) 09/01/2023    MEDICARE ANNUAL WELLNESS VISIT  03/02/2024    LIPID  03/09/2024    ANNUAL REVIEW OF HM ORDERS  03/08/2025    FALL RISK ASSESSMENT  03/08/2025    GLUCOSE  03/09/2026    ADVANCE CARE PLANNING  03/08/2029    DTAP/TDAP/TD IMMUNIZATION (6 - Td or Tdap) 01/01/2030    PHQ-2 (once per calendar year)  Completed    IPV IMMUNIZATION  Aged Out    HPV IMMUNIZATION  Aged Out    MENINGITIS IMMUNIZATION  Aged Out    RSV MONOCLONAL ANTIBODY  Aged Out    COLORECTAL CANCER SCREENING  Discontinued         Review of Systems  Constitutional, neuro, ENT, endocrine, pulmonary, cardiac, gastrointestinal, genitourinary, musculoskeletal, integument and psychiatric systems are negative, except as otherwise noted.     Objective    Exam  /71 (BP Location: Right arm, Patient Position: Sitting, Cuff Size: Adult Large)   Pulse 67   Temp 97.7  F (36.5  C) (Oral)   Resp 20   Ht 1.778 m (5' 10\")   Wt 93.4 kg (206 lb)   SpO2 97%   BMI 29.56 kg/m     Estimated body mass index is 29.56 kg/m  as calculated from the following:    Height as of this encounter: 1.778 m (5' 10\").    Weight as of this encounter: 93.4 kg (206 lb).    Physical Exam  GENERAL: alert and no distress  EYES: Eyes grossly normal to inspection, PERRL " and conjunctivae and sclerae normal  HENT: ear canals and TM's normal, nose and mouth without ulcers or lesions.  Wearing hearing aids  NECK: no adenopathy, no asymmetry, masses, or scars  RESP: lungs clear to auscultation - no rales, rhonchi or wheezes  CV: regular rate and rhythm, normal S1 S2, no S3 or S4, no murmur, click or rub, no peripheral edema  ABDOMEN: soft, nontender, no hepatosplenomegaly, no masses  MS: Likely Dupuytren's contracture left hand otherwise first and fifth digits otherwise no gross musculoskeletal defects noted, no edema  SKIN: no suspicious lesions or rashes  NEURO: Normal strength and tone, mentation intact and speech normal  PSYCH: mentation appears normal, affect normal/bright        3/8/2024   Mini Cog   Clock Draw Score 2 Normal   3 Item Recall 3 objects recalled   Mini Cog Total Score 5         The likelihood of other entities in the differential is insufficient to justify any further testing for them at this time. This was explained to the patient. The patient was advised that persistent or worsening symptoms would require further evaluation. Patient advised to call the office and if unable to reach to go to the emergency room if they develop any new or worsening symptoms. Expressed understanding and agreement with above stated plan.          Signed Electronically by: Spencer Carranza PA-C

## 2024-03-09 LAB
ALBUMIN SERPL BCG-MCNC: 4.6 G/DL (ref 3.5–5.2)
ALP SERPL-CCNC: 78 U/L (ref 40–150)
ALT SERPL W P-5'-P-CCNC: 32 U/L (ref 0–70)
ANION GAP SERPL CALCULATED.3IONS-SCNC: 10 MMOL/L (ref 7–15)
AST SERPL W P-5'-P-CCNC: 31 U/L (ref 0–45)
BILIRUB SERPL-MCNC: 0.3 MG/DL
BUN SERPL-MCNC: 18.7 MG/DL (ref 8–23)
CALCIUM SERPL-MCNC: 9.7 MG/DL (ref 8.8–10.2)
CHLORIDE SERPL-SCNC: 104 MMOL/L (ref 98–107)
CHOLEST SERPL-MCNC: 246 MG/DL
CREAT SERPL-MCNC: 1.13 MG/DL (ref 0.67–1.17)
DEPRECATED HCO3 PLAS-SCNC: 27 MMOL/L (ref 22–29)
EGFRCR SERPLBLD CKD-EPI 2021: 66 ML/MIN/1.73M2
FASTING STATUS PATIENT QL REPORTED: NO
GLUCOSE SERPL-MCNC: 121 MG/DL (ref 70–99)
HDLC SERPL-MCNC: 41 MG/DL
LDLC SERPL CALC-MCNC: ABNORMAL MG/DL
LDLC SERPL DIRECT ASSAY-MCNC: 157 MG/DL
NONHDLC SERPL-MCNC: 205 MG/DL
POTASSIUM SERPL-SCNC: 4.7 MMOL/L (ref 3.4–5.3)
PROT SERPL-MCNC: 6.9 G/DL (ref 6.4–8.3)
PSA SERPL DL<=0.01 NG/ML-MCNC: <0.01 NG/ML
SODIUM SERPL-SCNC: 141 MMOL/L (ref 135–145)
TRIGL SERPL-MCNC: 481 MG/DL
TSH SERPL DL<=0.005 MIU/L-ACNC: 1.28 UIU/ML (ref 0.3–4.2)

## 2024-03-11 NOTE — RESULT ENCOUNTER NOTE
Zachary Cowart,     Violetta seeing you last week!     -Stable blood counts. No signs of anemia.     -Your comprehensive metabolic panel which includes tests of liver function (ALT, AST, total bilirubin, alkaline phosphatase), kidney function (creatinine, BUN), electrolytes (sodium, potassium, calcium), and blood sugar (glucose) returned stable for you.    -TSH (thyroid stimulating hormone) level is normal which indicates normal circulating thyroid hormone levels.     -Your prostate specific antigen (PSA) test result returned normal.    -Cholesterol numbers continue to be elevated. This in the setting of a elevated coronary calcium score from 2019 definitely makes me consider a statin for you - have you ever been on one? This could be preventative. Let me know.     Let me know if you have any questions or concerns,     Spencer Carranza PA-C  [facility]

## 2024-06-08 ENCOUNTER — TRANSFERRED RECORDS (OUTPATIENT)
Dept: HEALTH INFORMATION MANAGEMENT | Facility: CLINIC | Age: 79
End: 2024-06-08

## 2024-11-26 ENCOUNTER — OFFICE VISIT (OUTPATIENT)
Dept: URGENT CARE | Facility: URGENT CARE | Age: 79
End: 2024-11-26
Payer: MEDICARE

## 2024-11-26 ENCOUNTER — ANCILLARY PROCEDURE (OUTPATIENT)
Dept: GENERAL RADIOLOGY | Facility: CLINIC | Age: 79
End: 2024-11-26
Payer: MEDICARE

## 2024-11-26 VITALS
HEART RATE: 74 BPM | TEMPERATURE: 98.7 F | WEIGHT: 206 LBS | BODY MASS INDEX: 29.56 KG/M2 | SYSTOLIC BLOOD PRESSURE: 124 MMHG | DIASTOLIC BLOOD PRESSURE: 83 MMHG | OXYGEN SATURATION: 98 % | RESPIRATION RATE: 18 BRPM

## 2024-11-26 DIAGNOSIS — R05.1 ACUTE COUGH: ICD-10-CM

## 2024-11-26 DIAGNOSIS — R05.1 ACUTE COUGH: Primary | ICD-10-CM

## 2024-11-26 PROCEDURE — 71046 X-RAY EXAM CHEST 2 VIEWS: CPT | Mod: TC | Performed by: RADIOLOGY

## 2024-11-26 RX ORDER — BENZONATATE 100 MG/1
100 CAPSULE ORAL 3 TIMES DAILY PRN
Qty: 24 CAPSULE | Refills: 0 | Status: SHIPPED | OUTPATIENT
Start: 2024-11-26

## 2024-11-26 RX ORDER — APIXABAN 5 MG/1
5 TABLET, FILM COATED ORAL 2 TIMES DAILY
COMMUNITY
Start: 2024-09-20

## 2024-11-26 ASSESSMENT — ENCOUNTER SYMPTOMS
WHEEZING: 0
FEVER: 0
LIGHT-HEADEDNESS: 0
DIARRHEA: 0
NAUSEA: 0
DIZZINESS: 0
DIFFICULTY URINATING: 0
VOMITING: 0
SORE THROAT: 0
DYSURIA: 0
ABDOMINAL PAIN: 0
HEADACHES: 0
PALPITATIONS: 0
SHORTNESS OF BREATH: 0
SINUS PAIN: 0
COUGH: 1
CHILLS: 0
ARTHRALGIAS: 0

## 2024-11-26 NOTE — PATIENT INSTRUCTIONS
1) Increase fluids and rest  2) Try Mucinex and Neti pot over the counter for congestion  3) Continue taking Tylenol/Ibuprofen for fever/pain relief as needed.  4) Salt water gargles and lozenges can be helpful for throat relief  5) Honey may be helpful for your cough  6) I prescribed a medication called benzonate that you can take as needed for your cough

## 2024-11-26 NOTE — PROGRESS NOTES
Patient presents with:  Urgent Care: Pt presents with persistent, productive cough for 3 days- denies fever  Pt states the coughing is worse at night and gets to the point of his whole body getting tensed up      Clinical Decision Making:      ICD-10-CM    1. Acute cough  R05.1 XR Chest 2 Views     benzonatate (TESSALON) 100 MG capsule        Chest xray negative for pneumonia. Patient well-appearing, vitally stable, afebrile, and has clear lung sounds today in clinic. Will treat as viral URI. Benzonatate prescribed as needed for cough. Patient instructions as below. Discussed red flag symptoms for when to return.       Patient Instructions   1) Increase fluids and rest  2) Try Mucinex and Neti pot over the counter for congestion  3) Continue taking Tylenol/Ibuprofen for fever/pain relief as needed.  4) Salt water gargles and lozenges can be helpful for throat relief  5) Honey may be helpful for your cough        HPI:  Supa Garcia is a 79 year old male who presents today with concerns of cough x3 days. Cough is worse at night. Denies shortness of breath or chest pain. Cough is productive and has associated chest congestion. He is otherwise feeling well. Wife was recently sick with similar symptoms but her symptoms have improved. Denies fevers.     History obtained from the patient.    Problem List:  2023-02: Osteoarthritis of left glenohumeral joint  2021-09: Chronic left shoulder pain  2021-01: Primary malignant neoplasm of prostate (H)  2020-06: Cardiac pacemaker in situ  2020-06: Complete atrioventricular block (H)  2019-09: Generalized atherosclerosis  2019-02: Syncope and collapse  2017-06: Allergy to insect stings  2016-12: Dupuytren's disease of palm  2016-05: Enlarged prostate  2015-09: Bifascicular block  2015-09: Hypothyroidism  2012-12: Actinic keratosis  2009-09: Disorder of anterior pituitary (H)  2009-09: Hypercholesterolemia      Past Medical History:   Diagnosis Date    Allergic state      Complete heart block (H)     s/p pacer    Elevated prostate specific antigen (PSA) 12/2020    High coronary artery calcium score     Hyperlipidemia LDL goal <130     Hypothyroidism     Prostate cancer (H)     Syncope        Social History     Tobacco Use    Smoking status: Never    Smokeless tobacco: Never   Substance Use Topics    Alcohol use: Yes     Alcohol/week: 3.0 standard drinks of alcohol     Types: 3 Standard drinks or equivalent per week     Comment: social         Review of Systems   Constitutional:  Negative for chills and fever.   HENT:  Positive for congestion. Negative for ear pain, sinus pain and sore throat.    Eyes:  Negative for visual disturbance.   Respiratory:  Positive for cough. Negative for shortness of breath and wheezing.    Cardiovascular:  Negative for chest pain and palpitations.   Gastrointestinal:  Negative for abdominal pain, diarrhea, nausea and vomiting.   Genitourinary:  Negative for difficulty urinating and dysuria.   Musculoskeletal:  Negative for arthralgias.   Skin:  Negative for rash.   Neurological:  Negative for dizziness, light-headedness and headaches.       Vitals:    11/26/24 1111   BP: 124/83   Pulse: 74   Resp: 18   Temp: 98.7  F (37.1  C)   TempSrc: Tympanic   SpO2: 98%   Weight: 93.4 kg (206 lb)       Physical Exam  Constitutional:       General: He is not in acute distress.     Appearance: He is not diaphoretic.   HENT:      Head: Normocephalic and atraumatic.      Right Ear: Tympanic membrane and external ear normal.      Left Ear: Tympanic membrane and external ear normal.      Mouth/Throat:      Pharynx: No oropharyngeal exudate or posterior oropharyngeal erythema.   Eyes:      Conjunctiva/sclera: Conjunctivae normal.   Cardiovascular:      Rate and Rhythm: Normal rate and regular rhythm.      Heart sounds: Normal heart sounds. No murmur heard.  Pulmonary:      Effort: Pulmonary effort is normal. No respiratory distress.      Breath sounds: Normal breath sounds.    Abdominal:      Palpations: Abdomen is soft.      Tenderness: There is no abdominal tenderness.   Musculoskeletal:         General: Normal range of motion.   Skin:     General: Skin is warm.      Capillary Refill: Capillary refill takes less than 2 seconds.   Neurological:      General: No focal deficit present.      Mental Status: He is alert.   Psychiatric:         Mood and Affect: Mood normal.         Thought Content: Thought content normal.         Judgment: Judgment normal.         Results:  Results for orders placed or performed in visit on 11/26/24   XR Chest 2 Views     Status: None (Preliminary result)    Narrative    CHEST TWO VIEWS  11/26/2024 11:39 AM     HISTORY: Acute cough.    COMPARISON: 1/29/2021.      Impression    IMPRESSION: No acute cardiopulmonary disease. Stable right chest  pacer. Right shoulder arthroplasty.       I have personally ordered and preliminarily reviewed the following xray, I have noted no evidence of pneumonia.         At the end of the encounter, I discussed results, diagnosis, medications. Discussed red flags for immediate return to clinic/ER, as well as indications for follow up if no improvement. Patient understood and agreed to plan. Patient was stable for discharge.    ELIOT Salcedo [facility]

## 2025-02-27 ENCOUNTER — DOCUMENTATION ONLY (OUTPATIENT)
Dept: ANTICOAGULATION | Facility: CLINIC | Age: 80
End: 2025-02-27
Payer: MEDICARE

## 2025-02-27 NOTE — PROGRESS NOTES
Anticoagulant Therapeutic Duplication    Duplicate orders identified: identical order(s)    The duplicate anticoagulant order(s) has been discontinued    Active anticoagulant: apixaban (Eliquis)    Plan made per [facility] anticoagulation protocol.    Brit June RN  2/27/2025

## 2025-03-07 SDOH — HEALTH STABILITY: PHYSICAL HEALTH: ON AVERAGE, HOW MANY MINUTES DO YOU ENGAGE IN EXERCISE AT THIS LEVEL?: 60 MIN

## 2025-03-07 SDOH — HEALTH STABILITY: PHYSICAL HEALTH: ON AVERAGE, HOW MANY DAYS PER WEEK DO YOU ENGAGE IN MODERATE TO STRENUOUS EXERCISE (LIKE A BRISK WALK)?: 4 DAYS

## 2025-03-07 ASSESSMENT — SOCIAL DETERMINANTS OF HEALTH (SDOH): HOW OFTEN DO YOU GET TOGETHER WITH FRIENDS OR RELATIVES?: THREE TIMES A WEEK

## 2025-03-12 ENCOUNTER — OFFICE VISIT (OUTPATIENT)
Dept: FAMILY MEDICINE | Facility: CLINIC | Age: 80
End: 2025-03-12
Payer: MEDICARE

## 2025-03-12 VITALS
RESPIRATION RATE: 22 BRPM | HEIGHT: 70 IN | OXYGEN SATURATION: 97 % | HEART RATE: 97 BPM | WEIGHT: 199.2 LBS | BODY MASS INDEX: 28.52 KG/M2 | SYSTOLIC BLOOD PRESSURE: 122 MMHG | TEMPERATURE: 96.9 F | DIASTOLIC BLOOD PRESSURE: 70 MMHG

## 2025-03-12 DIAGNOSIS — I48.91 ATRIAL FIBRILLATION, UNSPECIFIED TYPE (H): ICD-10-CM

## 2025-03-12 DIAGNOSIS — E78.00 HYPERCHOLESTEROLEMIA: Primary | ICD-10-CM

## 2025-03-12 DIAGNOSIS — Z13.89 SCREENING FOR GOUT: ICD-10-CM

## 2025-03-12 DIAGNOSIS — Z00.00 ENCOUNTER FOR ANNUAL WELLNESS EXAM IN MEDICARE PATIENT: ICD-10-CM

## 2025-03-12 DIAGNOSIS — M47.817 LUMBOSACRAL SPONDYLOSIS WITHOUT MYELOPATHY: ICD-10-CM

## 2025-03-12 DIAGNOSIS — Z85.820 HISTORY OF MELANOMA: ICD-10-CM

## 2025-03-12 DIAGNOSIS — C61 PRIMARY MALIGNANT NEOPLASM OF PROSTATE (H): ICD-10-CM

## 2025-03-12 DIAGNOSIS — Z13.1 SCREENING FOR DIABETES MELLITUS: ICD-10-CM

## 2025-03-12 DIAGNOSIS — E03.9 HYPOTHYROIDISM, UNSPECIFIED TYPE: ICD-10-CM

## 2025-03-12 LAB
BASOPHILS # BLD AUTO: 0 10E3/UL (ref 0–0.2)
BASOPHILS NFR BLD AUTO: 1 %
EOSINOPHIL # BLD AUTO: 0.2 10E3/UL (ref 0–0.7)
EOSINOPHIL NFR BLD AUTO: 4 %
ERYTHROCYTE [DISTWIDTH] IN BLOOD BY AUTOMATED COUNT: 13 % (ref 10–15)
EST. AVERAGE GLUCOSE BLD GHB EST-MCNC: 111 MG/DL
HBA1C MFR BLD: 5.5 % (ref 0–5.6)
HCT VFR BLD AUTO: 40.4 % (ref 40–53)
HGB BLD-MCNC: 13.4 G/DL (ref 13.3–17.7)
IMM GRANULOCYTES # BLD: 0 10E3/UL
IMM GRANULOCYTES NFR BLD: 0 %
LYMPHOCYTES # BLD AUTO: 0.8 10E3/UL (ref 0.8–5.3)
LYMPHOCYTES NFR BLD AUTO: 20 %
MCH RBC QN AUTO: 32 PG (ref 26.5–33)
MCHC RBC AUTO-ENTMCNC: 33.2 G/DL (ref 31.5–36.5)
MCV RBC AUTO: 96 FL (ref 78–100)
MONOCYTES # BLD AUTO: 0.4 10E3/UL (ref 0–1.3)
MONOCYTES NFR BLD AUTO: 9 %
NEUTROPHILS # BLD AUTO: 2.7 10E3/UL (ref 1.6–8.3)
NEUTROPHILS NFR BLD AUTO: 66 %
PLATELET # BLD AUTO: 198 10E3/UL (ref 150–450)
RBC # BLD AUTO: 4.19 10E6/UL (ref 4.4–5.9)
WBC # BLD AUTO: 4.1 10E3/UL (ref 4–11)

## 2025-03-12 RX ORDER — LACTOBACILLUS RHAMNOSUS GG 10B CELL
1 CAPSULE ORAL 2 TIMES DAILY
COMMUNITY

## 2025-03-12 ASSESSMENT — PAIN SCALES - GENERAL: PAINLEVEL_OUTOF10: NO PAIN (0)

## 2025-03-12 NOTE — PROGRESS NOTES
"Preventive Care Visit  [facility] KIM Carranza PA-C, Physician Assistant - Medical  Mar 12, 2025      Assessment & Plan     Encounter for annual wellness exam in Medicare patient  Annual labs ordered including those requested by his daughter. Healthy diet and exercise reviewed. Recommended routine dental, eye, and skin screenings.  Declines immunizations today.    - Lipid panel reflex to direct LDL Fasting  - TSH WITH FREE T4 REFLEX  - CBC with platelets and differential  - Comprehensive metabolic panel (BMP + Alb, Alk Phos, ALT, AST, Total. Bili, TP)  - Ferritin  - CRP, inflammation  - Homocysteine    Hypercholesterolemia  Check lipid panel as well as lipoprotein a as requested by his daughter.  Previously hesitant to start statin therapy.  Has a history of elevated coronary calcium score.  - Lipid panel reflex to direct LDL Fasting  - Lipoprotein (a)    Hypothyroidism, unspecified type  Check TSH today.  - TSH WITH FREE T4 REFLEX    Atrial fibrillation, unspecified type (H)  Follows with cardiology.  Pacemaker in place.    Primary malignant neoplasm of prostate (H)  Follows with urology.  PSA today.  - PSA, tumor marker    History of melanoma  Continue to see dermatology    Screening for diabetes mellitus  A1c ordered  - Hemoglobin A1c    Screening for gout  Lumbosacral spondylosis without myelopathy  Labs as requested by his daughter.  - Uric acid  - Ferritin  - CRP, inflammation  - Homocysteine    Patient has been advised of split billing requirements and indicates understanding: Yes    BMI  Estimated body mass index is 28.2 kg/m  as calculated from the following:    Height as of this encounter: 1.79 m (5' 10.47\").    Weight as of this encounter: 90.4 kg (199 lb 3.2 oz).   Weight management plan: Discussed healthy diet and exercise guidelines    Counseling  Appropriate preventive services were addressed with this patient via screening, questionnaire, or discussion as appropriate for " fall prevention, nutrition, physical activity, Tobacco-use cessation, social engagement, weight loss and cognition.  Checklist reviewing preventive services available has been given to the patient.  Reviewed patient's diet, addressing concerns and/or questions.         Thai Cowart is a very pleasant 79 year old, presenting for the following:  Physical    Here today for his annual wellness visit    Continue to stay active with [employer] as well as working out at [employer].  Recent diagnosis of dry macular degeneration, right eye.  Seeing [place] retinal specialist.  Wishes to get another opinion. Follows with cardiology.  Denies chest pain, heart racing or new leg swelling.  On Eliquis and metoprolol.  Echo from November from 2024 is stable.    Daughter works as a functional medicine provider.  Has some requests for labs including ferritin, fasting insulin, A1c, lipoprotein, uric acid, homocystine.  He is currently wearing a CGM            Advance Care Planning  Patient has a Health Care Directive on file  Advance care planning document is on file and is current.      3/7/2025   General Health   How would you rate your overall physical health? Good   Feel stress (tense, anxious, or unable to sleep) Only a little   (!) STRESS CONCERN      3/7/2025   Nutrition   Diet: Regular (no restrictions)         3/7/2025   Exercise   Days per week of moderate/strenous exercise 4 days   Average minutes spent exercising at this level 60 min         3/7/2025   Social Factors   Frequency of gathering with friends or relatives Three times a week   Worry food won't last until get money to buy more No   Food not last or not have enough money for food? No   Do you have housing? (Housing is defined as stable permanent housing and does not include staying ouside in a car, in a tent, in an abandoned building, in an overnight shelter, or couch-surfing.) No   Are you worried about losing your housing? No   Lack of  transportation? No   Unable to get utilities (heat,electricity)? No   Want help with housing or utility concern? No   (!) HOUSING CONCERN PRESENT      3/7/2025   Fall Risk   Fallen 2 or more times in the past year? No    No   Trouble with walking or balance? No    No       Multiple values from one day are sorted in reverse-chronological order          3/7/2025   Activities of Daily Living- Home Safety   Needs help with the following daily activites None of the above   Safety concerns in the home None of the above         3/7/2025   Dental   Dentist two times every year? Yes         3/7/2025   Hearing Screening   Hearing concerns? None of the above         3/7/2025   Driving Risk Screening   Patient/family members have concerns about driving No         3/7/2025   General Alertness/Fatigue Screening   Have you been more tired than usual lately? No         3/7/2025   Urinary Incontinence Screening   Bothered by leaking urine in past 6 months No           Today's PHQ-2 Score:       3/12/2025     9:57 AM   PHQ-2 ( 1999 [employer])   Q1: Little interest or pleasure in doing things 0   Q2: Feeling down, depressed or hopeless 0   PHQ-2 Score 0    Q1: Little interest or pleasure in doing things Not at all   Q2: Feeling down, depressed or hopeless Not at all   PHQ-2 Score 0       Patient-reported           3/7/2025   Substance Use   Alcohol more than 3/day or more than 7/wk No   Do you have a current opioid prescription? No   How severe/bad is pain from 1 to 10? 3/10   Do you use any other substances recreationally? No     Social History     Tobacco Use    Smoking status: Never    Smokeless tobacco: Never   Vaping Use    Vaping status: Never Used   Substance Use Topics    Alcohol use: Yes     Alcohol/week: 3.0 standard drinks of alcohol     Types: 3 Standard drinks or equivalent per week     Comment: social    Drug use: No                 Reviewed and updated as needed this visit by Provider   Tobacco  Allergies  Meds   Med Hx   Surg Hx  Fam Hx            Past Medical History:   Diagnosis Date    Allergic state     Complete heart block (H)     s/p pacer    Elevated prostate specific antigen (PSA) 12/2020    High coronary artery calcium score     Hyperlipidemia LDL goal <130     Hypothyroidism     Prostate cancer (H)     Syncope      Past Surgical History:   Procedure Laterality Date    HERNIA REPAIR       Lab work is in process  Labs reviewed in EPIC  BP Readings from Last 3 Encounters:   03/12/25 122/70   11/26/24 124/83   03/08/24 105/71    Wt Readings from Last 3 Encounters:   03/12/25 90.4 kg (199 lb 3.2 oz)   11/26/24 93.4 kg (206 lb)   03/08/24 93.4 kg (206 lb)                  Patient Active Problem List   Diagnosis    Syncope and collapse    Bifascicular block    Complete atrioventricular block (H)    Hypothyroidism    Allergy to insect stings    Actinic keratosis    Cardiac pacemaker in situ    Chronic left shoulder pain    Disorder of anterior pituitary    Dupuytren's disease of palm    Enlarged prostate    Generalized atherosclerosis    Hypercholesterolemia    Osteoarthritis of left glenohumeral joint    Primary malignant neoplasm of prostate (H)     Past Surgical History:   Procedure Laterality Date    HERNIA REPAIR         Social History     Tobacco Use    Smoking status: Never    Smokeless tobacco: Never   Substance Use Topics    Alcohol use: Yes     Alcohol/week: 3.0 standard drinks of alcohol     Types: 3 Standard drinks or equivalent per week     Comment: social     Family History   Problem Relation Age of Onset    Hyperlipidemia Father     Coronary Artery Disease Father     Coronary Artery Disease Brother         Age 62    Breast Cancer Sister          Current Outpatient Medications   Medication Sig Dispense Refill    amoxicillin (AMOXIL) 500 MG capsule       benzonatate (TESSALON) 100 MG capsule Take 1 capsule (100 mg) by mouth 3 times daily as needed for cough. 24 capsule 0    ELIQUIS ANTICOAGULANT 5 MG tablet Take 5  mg by mouth 2 times daily.      EPINEPHrine (ANY BX GENERIC EQUIV) 0.3 MG/0.3ML injection 2-pack INJECT INTRAMUSCULARLY AS DIRECTED FOR ALLERGIC REACTION      fish oil-omega-3 fatty acids 1000 MG capsule Take 1 g by mouth daily      lactobacillus rhamnosus, GG, (CULTURELL) capsule Take 1 capsule by mouth 2 times daily.      MAGNESIUM GLYCINATE PO Take 300 mg by mouth at bedtime.      metoprolol succinate ER (TOPROL XL) 25 MG 24 hr tablet Take 1 tablet (25 mg) by mouth 2 times daily 60 tablet 1    metoprolol tartrate (LOPRESSOR) 25 MG tablet 25 mg      NEW MED 1 capsule      UNABLE TO FIND MEDICATION NAME: Blood sugar/insulin support (Gluco IR)      UNABLE TO FIND MEDICATION NAME: Methylated B Complex      UNABLE TO FIND MEDICATION NAME: Fruits and greens - antioxidant/plant nutrients - 1 scoop daily      UNABLE TO FIND MEDICATION NAME: Organic coffee infused with Reishi      Vitamin D-Vitamin K (VITAMIN K2-VITAMIN D3 PO) Take by mouth daily      Multiple Vitamin (ONE-A-DAY ESSENTIAL) TABS Take 1 tablet by mouth daily (Patient not taking: Reported on 3/12/2025)      multivitamin (OCUVITE) TABS tablet Take 1 tablet by mouth daily (Patient not taking: Reported on 3/12/2025)      ofloxacin (OCUFLOX) 0.3 % ophthalmic solution  (Patient not taking: Reported on 3/12/2025)       Allergies   Allergen Reactions    Bee Venom      Recent Labs   Lab Test 03/12/25  1056 03/08/24  1407 03/09/23  0946 10/15/22  1103 02/24/22  1041 02/24/22  1041 04/06/21  1042 01/29/21  1040   A1C 5.5  --   --   --   --   --   --   --    LDL  --  157* 168*  --   --  176*  --   --    HDL  --  41 50  --   --  46  --   --    TRIG  --  481* 210*  --   --  183*  --   --    ALT  --  32 28 28   < > 28  --   --    CR  --  1.13 0.95 0.68   < > 0.86  --   --    GFRESTIMATED  --  66 81 >90   < > 89 82 72   GFRESTBLACK  --   --   --   --   --   --  >90 88   POTASSIUM  --  4.7 4.3 3.9   < > 3.9  --   --    TSH  --  1.28  --   --   --  0.94  --   --     < > =  "values in this interval not displayed.           Current providers sharing in care for this patient include:  Patient Care Team:  Spencer Carranza PA-C as PCP - General (Physician Assistant - Medical)  Spencer Carranza PA-C as Assigned PCP    The following health maintenance items are reviewed in Epic and correct as of today:  Health Maintenance   Topic Date Due    ZOSTER IMMUNIZATION (1 of 2) Never done    Pneumococcal Vaccine: 50+ Years (2 of 2 - PCV) 05/27/2009    RSV VACCINE (1 - 1-dose 75+ series) Never done    INFLUENZA VACCINE (1) 09/01/2024    COVID-19 Vaccine (4 - 2024-25 season) 09/01/2024    LIPID  03/08/2025    ANNUAL REVIEW OF HM ORDERS  03/08/2025    MEDICARE ANNUAL WELLNESS VISIT  03/08/2025    TSH W/FREE T4 REFLEX  03/08/2025    FALL RISK ASSESSMENT  03/12/2026    DTAP/TDAP/TD IMMUNIZATION (6 - Td or Tdap) 01/01/2030    ADVANCE CARE PLANNING  03/12/2030    PHQ-2 (once per calendar year)  Completed    HPV IMMUNIZATION  Aged Out    MENINGITIS IMMUNIZATION  Aged Out    COLORECTAL CANCER SCREENING  Discontinued         Review of Systems  Constitutional, neuro, ENT, endocrine, pulmonary, cardiac, gastrointestinal, genitourinary, musculoskeletal, integument and psychiatric systems are negative, except as otherwise noted.     Objective    Exam  /70   Pulse 97   Temp 96.9  F (36.1  C) (Temporal)   Resp 22   Ht 1.79 m (5' 10.47\")   Wt 90.4 kg (199 lb 3.2 oz)   SpO2 97%   BMI 28.20 kg/m     Estimated body mass index is 28.2 kg/m  as calculated from the following:    Height as of this encounter: 1.79 m (5' 10.47\").    Weight as of this encounter: 90.4 kg (199 lb 3.2 oz).    Physical Exam  GENERAL: alert and no distress  EYES: Eyes grossly normal to inspection, PERRL and conjunctivae and sclerae normal  HENT: ear canals and TM's normal, nose and mouth without ulcers or lesions  NECK: no adenopathy, no asymmetry, masses, or scars  RESP: lungs clear to auscultation - no rales, rhonchi or " wheezes  CV: regular rate and rhythm, normal S1 S2, no S3 or S4, no murmur, click or rub, no peripheral edema  ABDOMEN: soft, nontender, no hepatosplenomegaly, no masses and bowel sounds normal  MS: no gross musculoskeletal defects noted, no edema  SKIN: no suspicious lesions or rashes  NEURO: Normal strength and tone, mentation intact and speech normal  PSYCH: mentation appears normal, affect normal/bright         3/12/2025   Mini Cog   Clock Draw Score 2 Normal   3 Item Recall 3 objects recalled   Mini Cog Total Score 5         The likelihood of other entities in the differential is insufficient to justify any further testing for them at this time. This was explained to the patient. The patient was advised that persistent or worsening symptoms would require further evaluation. Patient advised to call the office and if unable to reach to go to the emergency room if they develop any new or worsening symptoms. Expressed understanding and agreement with above stated plan.          Signed Electronically by: Spencer Carranza PA-C

## 2025-03-13 LAB
APO A-I SERPL-MCNC: 41 MG/DL
CHOLEST SERPL-MCNC: 240 MG/DL
CRP SERPL-MCNC: 3.68 MG/L
FASTING STATUS PATIENT QL REPORTED: YES
FERRITIN SERPL-MCNC: 233 NG/ML (ref 31–409)
HCYS SERPL-SCNC: 9.2 UMOL/L (ref 0–15)
HDLC SERPL-MCNC: 50 MG/DL
INSULIN SERPL-ACNC: 6 UU/ML (ref 2.6–24.9)
LDLC SERPL CALC-MCNC: 146 MG/DL
NONHDLC SERPL-MCNC: 190 MG/DL
PSA SERPL DL<=0.01 NG/ML-MCNC: <0.01 NG/ML
TRIGL SERPL-MCNC: 218 MG/DL
TSH SERPL DL<=0.005 MIU/L-ACNC: 1.91 UIU/ML (ref 0.3–4.2)
URATE SERPL-MCNC: 5 MG/DL (ref 3.4–7)

## 2025-03-16 LAB
ALBUMIN SERPL BCG-MCNC: 4.4 G/DL (ref 3.5–5.2)
ALP SERPL-CCNC: 66 U/L (ref 40–150)
ALT SERPL W P-5'-P-CCNC: 25 U/L (ref 0–70)
ANION GAP SERPL CALCULATED.3IONS-SCNC: 12 MMOL/L (ref 7–15)
AST SERPL W P-5'-P-CCNC: 25 U/L (ref 0–45)
BILIRUB SERPL-MCNC: 0.3 MG/DL
BUN SERPL-MCNC: 16 MG/DL (ref 8–23)
CALCIUM SERPL-MCNC: 10.1 MG/DL (ref 8.8–10.4)
CHLORIDE SERPL-SCNC: 104 MMOL/L (ref 98–107)
CREAT SERPL-MCNC: 0.9 MG/DL (ref 0.67–1.17)
EGFRCR SERPLBLD CKD-EPI 2021: 86 ML/MIN/1.73M2
FASTING STATUS PATIENT QL REPORTED: YES
GLUCOSE SERPL-MCNC: 101 MG/DL (ref 70–99)
HCO3 SERPL-SCNC: 23 MMOL/L (ref 22–29)
POTASSIUM SERPL-SCNC: 4.6 MMOL/L (ref 3.4–5.3)
PROT SERPL-MCNC: 6.9 G/DL (ref 6.4–8.3)
SODIUM SERPL-SCNC: 139 MMOL/L (ref 135–145)

## 2025-03-21 ENCOUNTER — TELEPHONE (OUTPATIENT)
Dept: FAMILY MEDICINE | Facility: CLINIC | Age: 80
End: 2025-03-21
Payer: MEDICARE

## 2025-03-21 NOTE — TELEPHONE ENCOUNTER
Results reviewed with Patient.    Patient states he is fine without the Statin right now, and is not interested.    Criselda SOLANO,  Suman RN  [facility]

## 2025-03-21 NOTE — TELEPHONE ENCOUNTER
Zachary Cowart,     -Stable blood counts. No signs of anemia.     -Your comprehensive metabolic panel which includes tests of liver function (ALT, AST, total bilirubin, alkaline phosphatase), kidney function (creatinine, BUN), electrolytes (sodium, potassium, calcium), and blood sugar (glucose) returned stable for you.     -TSH (thyroid stimulating hormone) level is normal which indicates normal circulating thyroid hormone levels.     -A1c shows no signs of diabetes or pre-diabetes     -Normal Uric acid level     -Cholesterol and lipoprotein a are both elevated - I would strongly recommend a statin to lower your cardiovascular risk - can I send one over for you? Start every other day and then gradually increase to daily for better tolerance.     -Normal PSA     Let me know if you wish to pursue the statin.     Spencer Carranza PA-C  [facility]

## 2025-05-15 ENCOUNTER — TELEPHONE (OUTPATIENT)
Dept: FAMILY MEDICINE | Facility: CLINIC | Age: 80
End: 2025-05-15
Payer: MEDICARE

## 2025-05-15 DIAGNOSIS — C61 PRIMARY MALIGNANT NEOPLASM OF PROSTATE (H): ICD-10-CM

## 2025-05-15 DIAGNOSIS — E78.00 HYPERCHOLESTEROLEMIA: ICD-10-CM

## 2025-05-15 DIAGNOSIS — E03.9 HYPOTHYROIDISM, UNSPECIFIED TYPE: ICD-10-CM

## 2025-05-15 DIAGNOSIS — Z00.00 MEDICARE ANNUAL WELLNESS VISIT, SUBSEQUENT: Primary | ICD-10-CM

## 2025-05-15 NOTE — TELEPHONE ENCOUNTER
Order/Referral Request    Who is requesting: Patient    Orders being requested: Labs- blood work from [facility]    Reason service is needed/diagnosis: He lost 15 pounds since his last visit on March 12th 2025. He would like to have the same labs he had done at his last [facility].     When are orders needed by: asap    Has this been discussed with Provider: Yes    Does patient have a preference on a Group/Provider/Facility? [facility]    Does patient have an appointment scheduled?: No    Where to send orders: Place orders within Epic    Could we send this information to you in [facility] or would you prefer to receive a phone call?:   Patient would prefer a phone call   Okay to leave a detailed message?: Yes at Cell number on file:    Telephone Information:   Mobile [phone]

## 2025-05-21 ENCOUNTER — LAB (OUTPATIENT)
Dept: LAB | Facility: CLINIC | Age: 80
End: 2025-05-21
Payer: MEDICARE

## 2025-05-21 DIAGNOSIS — C61 PRIMARY MALIGNANT NEOPLASM OF PROSTATE (H): ICD-10-CM

## 2025-05-21 DIAGNOSIS — E78.00 HYPERCHOLESTEROLEMIA: ICD-10-CM

## 2025-05-21 DIAGNOSIS — E03.9 HYPOTHYROIDISM, UNSPECIFIED TYPE: ICD-10-CM

## 2025-05-21 DIAGNOSIS — Z00.00 MEDICARE ANNUAL WELLNESS VISIT, SUBSEQUENT: ICD-10-CM

## 2025-05-21 LAB
ALBUMIN SERPL BCG-MCNC: 4.5 G/DL (ref 3.5–5.2)
ALP SERPL-CCNC: 90 U/L (ref 40–150)
ALT SERPL W P-5'-P-CCNC: 29 U/L (ref 0–70)
ANION GAP SERPL CALCULATED.3IONS-SCNC: 10 MMOL/L (ref 7–15)
AST SERPL W P-5'-P-CCNC: 26 U/L (ref 0–45)
BILIRUB SERPL-MCNC: 0.3 MG/DL
BUN SERPL-MCNC: 24.9 MG/DL (ref 8–23)
CALCIUM SERPL-MCNC: 9.5 MG/DL (ref 8.8–10.4)
CHLORIDE SERPL-SCNC: 106 MMOL/L (ref 98–107)
CHOLEST SERPL-MCNC: 246 MG/DL
CREAT SERPL-MCNC: 0.86 MG/DL (ref 0.67–1.17)
EGFRCR SERPLBLD CKD-EPI 2021: 86 ML/MIN/1.73M2
ERYTHROCYTE [DISTWIDTH] IN BLOOD BY AUTOMATED COUNT: 13.3 % (ref 10–15)
FASTING STATUS PATIENT QL REPORTED: YES
GLUCOSE SERPL-MCNC: 104 MG/DL (ref 70–99)
HCO3 SERPL-SCNC: 25 MMOL/L (ref 22–29)
HCT VFR BLD AUTO: 40.6 % (ref 40–53)
HDLC SERPL-MCNC: 56 MG/DL
HGB BLD-MCNC: 13.7 G/DL (ref 13.3–17.7)
LDLC SERPL CALC-MCNC: 164 MG/DL
MCH RBC QN AUTO: 32.5 PG (ref 26.5–33)
MCHC RBC AUTO-ENTMCNC: 33.7 G/DL (ref 31.5–36.5)
MCV RBC AUTO: 96 FL (ref 78–100)
NONHDLC SERPL-MCNC: 190 MG/DL
PLATELET # BLD AUTO: 186 10E3/UL (ref 150–450)
POTASSIUM SERPL-SCNC: 4.6 MMOL/L (ref 3.4–5.3)
PROT SERPL-MCNC: 7.3 G/DL (ref 6.4–8.3)
PSA SERPL DL<=0.01 NG/ML-MCNC: <0.01 NG/ML
RBC # BLD AUTO: 4.22 10E6/UL (ref 4.4–5.9)
SODIUM SERPL-SCNC: 141 MMOL/L (ref 135–145)
TRIGL SERPL-MCNC: 128 MG/DL
TSH SERPL DL<=0.005 MIU/L-ACNC: 2.86 UIU/ML (ref 0.3–4.2)
WBC # BLD AUTO: 4.6 10E3/UL (ref 4–11)

## 2025-05-21 PROCEDURE — 80053 COMPREHEN METABOLIC PANEL: CPT

## 2025-05-21 PROCEDURE — 82465 ASSAY BLD/SERUM CHOLESTEROL: CPT

## 2025-05-21 PROCEDURE — 84153 ASSAY OF PSA TOTAL: CPT

## 2025-05-21 PROCEDURE — 36415 COLL VENOUS BLD VENIPUNCTURE: CPT

## 2025-05-21 PROCEDURE — 85027 COMPLETE CBC AUTOMATED: CPT

## 2025-05-21 PROCEDURE — 84443 ASSAY THYROID STIM HORMONE: CPT

## 2025-05-22 ENCOUNTER — RESULTS FOLLOW-UP (OUTPATIENT)
Dept: FAMILY MEDICINE | Facility: CLINIC | Age: 80
End: 2025-05-22

## 2025-05-22 NOTE — RESULT ENCOUNTER NOTE
Zachary Geiger,     -Stable blood counts. No signs of anemia.     -Your comprehensive metabolic panel which includes tests of liver function (ALT, AST, total bilirubin, alkaline phosphatase), kidney function (creatinine, BUN), electrolytes (sodium, potassium, calcium), and blood sugar (glucose) returned stable for you.    - PSA tumor marker is normal    -TSH (thyroid stimulating hormone) level is normal which indicates normal circulating thyroid hormone levels.     - Cholesterol levels are stable -would still consider cholesterol-lowering medications.    Let me know if you have any questions or concerns,     Spencer Carranza PA-C  [facility]

## 2025-06-26 ENCOUNTER — TRANSFERRED RECORDS (OUTPATIENT)
Dept: HEALTH INFORMATION MANAGEMENT | Facility: CLINIC | Age: 80
End: 2025-06-26
Payer: MEDICARE